# Patient Record
Sex: FEMALE | Race: WHITE | Employment: OTHER | URBAN - METROPOLITAN AREA
[De-identification: names, ages, dates, MRNs, and addresses within clinical notes are randomized per-mention and may not be internally consistent; named-entity substitution may affect disease eponyms.]

---

## 2022-10-24 ENCOUNTER — PREP FOR PROCEDURE (OUTPATIENT)
Dept: ORTHOPEDIC SURGERY | Age: 72
End: 2022-10-24

## 2022-10-24 DIAGNOSIS — Z01.818 PRE-OP TESTING: Primary | ICD-10-CM

## 2022-10-24 RX ORDER — SODIUM CHLORIDE 9 MG/ML
INJECTION, SOLUTION INTRAVENOUS PRN
Status: CANCELLED | OUTPATIENT
Start: 2022-10-24

## 2022-10-24 RX ORDER — SODIUM CHLORIDE 0.9 % (FLUSH) 0.9 %
5-40 SYRINGE (ML) INJECTION PRN
Status: CANCELLED | OUTPATIENT
Start: 2022-10-24

## 2022-10-24 RX ORDER — SODIUM CHLORIDE, SODIUM LACTATE, POTASSIUM CHLORIDE, CALCIUM CHLORIDE 600; 310; 30; 20 MG/100ML; MG/100ML; MG/100ML; MG/100ML
INJECTION, SOLUTION INTRAVENOUS CONTINUOUS
Status: CANCELLED | OUTPATIENT
Start: 2022-10-24

## 2022-10-24 RX ORDER — SODIUM CHLORIDE 0.9 % (FLUSH) 0.9 %
5-40 SYRINGE (ML) INJECTION EVERY 12 HOURS SCHEDULED
Status: CANCELLED | OUTPATIENT
Start: 2022-10-24

## 2022-10-24 RX ORDER — ACETAMINOPHEN 325 MG/1
1000 TABLET ORAL ONCE
Status: CANCELLED | OUTPATIENT
Start: 2022-10-24 | End: 2022-10-24

## 2022-10-24 NOTE — H&P
HISTORY AND PHYSICAL             Date: 10/24/2022        Patient Name: Ana Cristina Grace     YOB: 1950      Age:  67 y.o. Chief Complaint   Bilateral knee pain          History Obtained From   patient    History of Present Illness   70year-old retiree presented for evaluation of bilateral knee pain and stiffness. Progressive symptoms over the last 4 years. Previous successful left hip replacement in [place]. Has noted significant loss of range of motion in her knees. Difficulty rising from a chair and negotiating a step. Has to hold onto someone and/or use a cane. No prior surgery on either knee. Has been using meloxicam and Tylenol. Previous intra-articular injections without much symptom reduction. Notes reduction in quality of life. Intermittent night pain in both knees. No history of DVT. Past Medical History   No past medical history on file. Past Surgical History   No past surgical history on file. Medications Prior to Admission     Prior to Admission medications    Not on File        Allergies   Patient has no allergy information on record. Social History     Social History    None         Family History   No family history on file. Review of Systems   Review of Systems  Genitourinary: Positive for urinary frequency. Musculoskeletal: Positive for joint swelling, stiffness, arthritis. The remainder of the complete review of systems was negative. Physical Exam   Weight: 245.00 lbs. (111.13 kg.)   Height: 63 in. (160.02 cm.)   Body Mass Index: 43.40   Body Surface Area: 2.11 m2    Physical Exam  General Appearance:   Well developed, well nourished   Eyes:   Eyes appear Normal   Respiratory:   Respiratory effort: non-labored   Cardiovascular:   Dorsalis Pedis Pulse: palpable   Edema: mild   Posterior Tibal Pulse: palpable   Varicosities: absent   Lymphatic/Skin:   Lymphedema Noted: no   Skin Appearance: Normal   Prior Incisions: Absent   Abdomen:   Soft. Nontender. Psychiatric:   Mood and affect: Normal     Right Knee Exam   Inspection   Gait: antalgic   Alignment: varus   Effusion: none   Quadriceps Atrophy: mild   Pain/Tenderness: anterior, medial joint line and lateral joint line   Neurovascularly intact   No signs or symptoms of infection   Laxity with vargus/valgus stress: 6-9 degrees   ROM   Hip ROM: normal   Active ROM   Crepitus with range of motion   Passive ROM   Flexion: . 60 deg   Extension: 5 deg   Muscle Strength   Quadriceps strength: 4/5   Hamstring strength: 4/5   Stability   Medial/MCL: normal   Lateral/LCL: normal   Patella   Patellofemoral Crepitus: yes   Patella Grind: yes   Patella Mobility: fair   Extensor Mechanism: intact   Able to logroll without pain: yes   Other Comments   Negative Homans     Left Knee Exam   Inspection   Gait: antalgic   Alignment: varus   Effusion: none   Quadriceps Atrophy: mild   Pain/Tenderness: anterior, medial joint line and lateral joint line   Neurovascularly intact   No signs or symptoms of infection   Laxity with vargus/valgus stress: 6-9 degrees   ROM   Hip ROM: normal   Active ROM   Crepitus with range of motion   Passive ROM   Flexion: 80 deg   Extension: normal deg   Muscle Strength   Quadriceps strength: 4/5   Hamstring strength: 4/5   Stability   Medial/MCL: normal   Lateral/LCL: normal   Meniscus   Iesha Test: negative   Patella   Patellofemoral Crepitus: yes   Patella Grind: yes   Extensor Mechanism: intact   Able to logroll without pain: yes   Other Comments   Negative Homans    Labs    No results found for this or any previous visit (from the past 24 hour(s)). Imaging/Diagnostics Last 24 Hours   Right Knee X-Ray   X-fay findings: Severe tricompartmental osteoarthritis right knee. Bone-on-bone contact. Abundant marginal osteophytes from the femur, posterior tibia, and patellofemoral articulation. Subchondral sclerosis. Varus alignment. End-stage DJD.     Left Knee X-Ray   X-ray Findings: Severe left knee osteoarthritis. Tricompartmental bone-on-bone contact. Abundant marginal osteophytes in the patellofemoral joint, posterior femur and posterior tibia. Subchondral sclerosis. Varus alignment. Assessment    1. Knee joint pain, left  2. Knee joint pain, right  3. Stiffness of left knee joint  4. Stiffness of right knee joint  5. Left knee osteoarthritis  6. Right knee osteoarthritis    Plan   Severe bilateral knee osteoarthritis with stiffness unresponsive to conservative treatment. Patient updated regarding x-ray findings. Reviewed treatment options. I think she would notice improvement in both pain and knee function/range of motion if she proceeded with elective total joint replacement in staged fashion. I reviewed the surgical procedure, including implants, as well as the expected postoperative course. The risks benefits alternatives and limitations were discussed and informed consent obtained. Initiate physical therapy prior to surgery to help with strengthening and range of motion as well as gait training. Surgical date left TKA 11/3/2022 at [facility]. Preoperative instructions given. All questions addressed. Return to office 2 weeks postop with x-rays left knee.     Consultations Ordered:  None    Electronically signed by Osorio Merida PA-C on 10/24/22 at 8:27 AM LIVIER

## 2022-10-31 ENCOUNTER — ANESTHESIA EVENT (OUTPATIENT)
Dept: OPERATING ROOM | Age: 72
End: 2022-10-31
Payer: MEDICARE

## 2022-10-31 ENCOUNTER — HOSPITAL ENCOUNTER (OUTPATIENT)
Dept: PREADMISSION TESTING | Age: 72
Discharge: HOME | End: 2022-10-31
Payer: MEDICARE

## 2022-10-31 VITALS
HEIGHT: 63 IN | WEIGHT: 243 LBS | BODY MASS INDEX: 43.05 KG/M2 | TEMPERATURE: 96.9 F | DIASTOLIC BLOOD PRESSURE: 81 MMHG | OXYGEN SATURATION: 96 % | SYSTOLIC BLOOD PRESSURE: 194 MMHG | RESPIRATION RATE: 20 BRPM | HEART RATE: 106 BPM

## 2022-10-31 DIAGNOSIS — Z01.818 PRE-OP TESTING: ICD-10-CM

## 2022-10-31 LAB
ANION GAP SERPL CALCULATED.3IONS-SCNC: 13 MMOL/L (ref 7–16)
BACTERIA: NORMAL /HPF
BILIRUBIN URINE: NEGATIVE
BLOOD, URINE: ABNORMAL
BUN BLDV-MCNC: 19 MG/DL (ref 6–23)
CALCIUM SERPL-MCNC: 10.2 MG/DL (ref 8.6–10.2)
CHLORIDE BLD-SCNC: 101 MMOL/L (ref 98–107)
CLARITY: CLEAR
CO2: 26 MMOL/L (ref 22–29)
COLOR: YELLOW
CREAT SERPL-MCNC: 0.8 MG/DL (ref 0.5–1)
GFR SERPL CREATININE-BSD FRML MDRD: >60 ML/MIN/1.73
GLUCOSE BLD-MCNC: 127 MG/DL (ref 74–99)
GLUCOSE URINE: NEGATIVE MG/DL
HCT VFR BLD CALC: 45.6 % (ref 34–48)
HEMOGLOBIN: 13.7 G/DL (ref 11.5–15.5)
KETONES, URINE: 15 MG/DL
LEUKOCYTE ESTERASE, URINE: NEGATIVE
MCH RBC QN AUTO: 26.9 PG (ref 26–35)
MCHC RBC AUTO-ENTMCNC: 30 % (ref 32–34.5)
MCV RBC AUTO: 89.6 FL (ref 80–99.9)
NITRITE, URINE: NEGATIVE
PDW BLD-RTO: 15.4 FL (ref 11.5–15)
PH UA: 6 (ref 5–9)
PLATELET # BLD: 286 E9/L (ref 130–450)
PMV BLD AUTO: 9.9 FL (ref 7–12)
POTASSIUM SERPL-SCNC: 4.2 MMOL/L (ref 3.5–5)
PROTEIN UA: ABNORMAL MG/DL
RBC # BLD: 5.09 E12/L (ref 3.5–5.5)
RBC UA: NORMAL /HPF (ref 0–2)
SODIUM BLD-SCNC: 140 MMOL/L (ref 132–146)
SPECIFIC GRAVITY UA: 1.02 (ref 1–1.03)
UROBILINOGEN, URINE: 0.2 E.U./DL
WBC # BLD: 9.7 E9/L (ref 4.5–11.5)
WBC UA: NORMAL /HPF (ref 0–5)

## 2022-10-31 PROCEDURE — 81001 URINALYSIS AUTO W/SCOPE: CPT

## 2022-10-31 PROCEDURE — 85027 COMPLETE CBC AUTOMATED: CPT

## 2022-10-31 PROCEDURE — 36415 COLL VENOUS BLD VENIPUNCTURE: CPT

## 2022-10-31 PROCEDURE — 87081 CULTURE SCREEN ONLY: CPT

## 2022-10-31 PROCEDURE — 80048 BASIC METABOLIC PNL TOTAL CA: CPT

## 2022-10-31 RX ORDER — CLONIDINE HYDROCHLORIDE 0.1 MG/1
TABLET ORAL
COMMUNITY
Start: 2022-10-20 | End: 2022-11-29 | Stop reason: ALTCHOICE

## 2022-10-31 RX ORDER — ZINC GLUCONATE 50 MG
50 TABLET ORAL DAILY
Status: ON HOLD | COMMUNITY

## 2022-10-31 RX ORDER — LEVOTHYROXINE SODIUM 0.07 MG/1
75 TABLET ORAL DAILY
Status: ON HOLD | COMMUNITY
Start: 2022-09-08

## 2022-10-31 RX ORDER — LOSARTAN POTASSIUM 100 MG/1
100 TABLET ORAL DAILY
Status: ON HOLD | COMMUNITY
End: 2022-11-06 | Stop reason: HOSPADM

## 2022-10-31 RX ORDER — MELOXICAM 15 MG/1
15 TABLET ORAL DAILY
Status: ON HOLD | COMMUNITY
Start: 2022-09-08

## 2022-10-31 RX ORDER — ASPIRIN 81 MG/1
81 TABLET ORAL DAILY
Status: ON HOLD | COMMUNITY
End: 2022-11-04 | Stop reason: HOSPADM

## 2022-10-31 RX ORDER — ASCORBIC ACID 500 MG
1000 TABLET ORAL DAILY
Status: ON HOLD | COMMUNITY

## 2022-10-31 RX ORDER — PRAVASTATIN SODIUM 80 MG/1
80 TABLET ORAL DAILY
Status: ON HOLD | COMMUNITY
Start: 2018-03-23

## 2022-10-31 ASSESSMENT — KOOS JR
BENDING TO THE FLOOR TO PICK UP OBJECT: 1
STANDING UPRIGHT: 2
TWISING OR PIVOTING ON KNEE: 4
KOOS JR TOTAL INTERVAL SCORE: 31.307
STRAIGHTENING KNEE FULLY: 4
GOING UP OR DOWN STAIRS: 4
RISING FROM SITTING: 4
HOW SEVERE IS YOUR KNEE STIFFNESS AFTER FIRST WAKING IN MORNING: 3

## 2022-10-31 ASSESSMENT — PAIN DESCRIPTION - PAIN TYPE: TYPE: CHRONIC PAIN

## 2022-10-31 ASSESSMENT — PAIN DESCRIPTION - ORIENTATION: ORIENTATION: LEFT

## 2022-10-31 ASSESSMENT — PAIN - FUNCTIONAL ASSESSMENT: PAIN_FUNCTIONAL_ASSESSMENT: PREVENTS OR INTERFERES SOME ACTIVE ACTIVITIES AND ADLS

## 2022-10-31 ASSESSMENT — PAIN DESCRIPTION - FREQUENCY: FREQUENCY: INTERMITTENT

## 2022-10-31 ASSESSMENT — PAIN DESCRIPTION - DESCRIPTORS: DESCRIPTORS: SHARP

## 2022-10-31 ASSESSMENT — PAIN SCALES - GENERAL: PAINLEVEL_OUTOF10: 5

## 2022-10-31 ASSESSMENT — PAIN DESCRIPTION - LOCATION: LOCATION: KNEE

## 2022-10-31 ASSESSMENT — PAIN DESCRIPTION - ONSET: ONSET: ON-GOING

## 2022-10-31 NOTE — ANESTHESIA PRE PROCEDURE
Department of Anesthesiology  Preprocedure Note       Name:  Jesús Carroll   Age:  67 y.o.  :  1950                                          MRN:  [id number]         Date:  10/31/2022      Surgeon: Bruce Beverly):  Batsheva Madrigal MD    Procedure: Procedure(s):  LEFT KNEE TOTAL ARTHROPLASTY   ++RYLEY++  ++PNB++    Medications prior to admission:   Prior to Admission medications    Medication Sig Start Date End Date Taking? Authorizing Provider   levothyroxine (SYNTHROID) 75 MCG tablet TAKE 1 TABLET BY MOUTH ONCE DAILY 22   Historical Provider, MD   pravastatin (PRAVACHOL) 80 MG tablet  3/23/18   Historical Provider, MD   meloxicam (MOBIC) 15 MG tablet TAKE 1 TABLET BY MOUTH ONCE DAILY 22   Historical Provider, MD   cloNIDine (CATAPRES) 0.1 MG tablet TAKE 1 TABLET BY MOUTH 4 TIMES DAILY AS NEEDED FOR BLOOD PRESSURE WHEN SBP>160. Patient not taking: Reported on 10/31/2022 10/20/22   Historical Provider, MD   losartan (COZAAR) 100 MG tablet Take 100 mg by mouth daily    Historical Provider, MD   aspirin 81 MG EC tablet Take 81 mg by mouth daily    Historical Provider, MD   vitamin C (ASCORBIC ACID) 500 MG tablet Take 1,000 mg by mouth daily    Historical Provider, MD   Cholecalciferol (VITAMIN D3) 125 MCG (5000 UT) TABS Take 1 tablet by mouth daily    Historical Provider, MD   zinc gluconate 50 MG tablet Take 50 mg by mouth daily    Historical Provider, MD       Current medications:    No current facility-administered medications for this encounter. Current Outpatient Medications   Medication Sig Dispense Refill    levothyroxine (SYNTHROID) 75 MCG tablet TAKE 1 TABLET BY MOUTH ONCE DAILY      pravastatin (PRAVACHOL) 80 MG tablet       meloxicam (MOBIC) 15 MG tablet TAKE 1 TABLET BY MOUTH ONCE DAILY      cloNIDine (CATAPRES) 0.1 MG tablet TAKE 1 TABLET BY MOUTH 4 TIMES DAILY AS NEEDED FOR BLOOD PRESSURE WHEN SBP>160.  (Patient not taking: Reported on 10/31/2022)      losartan (COZAAR) 100 MG tablet Take 100 mg by mouth daily      aspirin 81 MG EC tablet Take 81 mg by mouth daily      vitamin C (ASCORBIC ACID) 500 MG tablet Take 1,000 mg by mouth daily      Cholecalciferol (VITAMIN D3) 125 MCG (5000 UT) TABS Take 1 tablet by mouth daily      zinc gluconate 50 MG tablet Take 50 mg by mouth daily         Allergies:  No Known Allergies    Problem List:  There is no problem list on file for this patient. Past Medical History:        Diagnosis Date    Arthritis     Hyperlipidemia     Hypertension     PONV (postoperative nausea and vomiting)     Thyroid disease        Past Surgical History:        Procedure Laterality Date    BREAST SURGERY Left     cysts removed, hyplasia    COLONOSCOPY  2012    HYSTERECTOMY (CERVIX STATUS UNKNOWN)  1980    JOINT REPLACEMENT Left 2018    hip       Social History:    Social History     Tobacco Use    Smoking status: Never    Smokeless tobacco: Never   Substance Use Topics    Alcohol use: Not Currently                                Counseling given: Not Answered      Vital Signs (Current): There were no vitals filed for this visit.                                            BP Readings from Last 3 Encounters:   10/31/22 (!) 194/81       NPO Status:                                                                                 BMI:   Wt Readings from Last 3 Encounters:   10/31/22 243 lb (110.2 kg)     There is no height or weight on file to calculate BMI.    CBC:   Lab Results   Component Value Date/Time    WBC 9.7 10/31/2022 10:50 AM    RBC 5.09 10/31/2022 10:50 AM    HGB 13.7 10/31/2022 10:50 AM    HCT 45.6 10/31/2022 10:50 AM    MCV 89.6 10/31/2022 10:50 AM    RDW 15.4 10/31/2022 10:50 AM     10/31/2022 10:50 AM       CMP:   Lab Results   Component Value Date/Time     10/31/2022 10:50 AM    K 4.2 10/31/2022 10:50 AM     10/31/2022 10:50 AM    CO2 26 10/31/2022 10:50 AM    BUN 19 10/31/2022 10:50 AM    CREATININE 0.8 10/31/2022 10:50 AM LABGLOM >60 10/31/2022 10:50 AM    GLUCOSE 127 10/31/2022 10:50 AM    CALCIUM 10.2 10/31/2022 10:50 AM       POC Tests: No results for input(s): POCGLU, POCNA, POCK, POCCL, POCBUN, POCHEMO, POCHCT in the last 72 hours. Coags: No results found for: PROTIME, INR, APTT    HCG (If Applicable): No results found for: PREGTESTUR, PREGSERUM, HCG, HCGQUANT     ABGs: No results found for: PHART, PO2ART, GZQ3UBA, KNM4JSA, BEART, P0SCQITG     Type & Screen (If Applicable):  No results found for: LABABO, LABRH    Drug/Infectious Status (If Applicable):  No results found for: HIV, HEPCAB    COVID-19 Screening (If Applicable): No results found for: COVID19        Anesthesia Evaluation  Patient summary reviewed and Nursing notes reviewed   history of anesthetic complications: PONV. Airway: Mallampati: III  TM distance: >3 FB   Neck ROM: full  Mouth opening: > = 3 FB   Dental:      Comment: None loose    Pulmonary:Negative Pulmonary ROS breath sounds clear to auscultation                             Cardiovascular:    (+) hypertension:,       ECG reviewed  Rhythm: regular  Rate: normal           Beta Blocker:  Not on Beta Blocker         Neuro/Psych:   (+) depression/anxiety             GI/Hepatic/Renal:   (+) hiatal hernia, morbid obesity          Endo/Other:    (+) hypothyroidism: arthritis:., .                 Abdominal:             Vascular: Other Findings:           Anesthesia Plan      spinal and general     ASA 3     (Medical clearance on chart. Discussed with pt. And her  [employer] Adductor canal block and they agree to this.)  Induction: intravenous. Anesthetic plan and risks discussed with patient and spouse. Use of blood products discussed with patient and spouse whom. Plan discussed with CRNA.                     Edward Diop MD   10/31/2022        Chart reviewed, agree with above  CHRISTO Osborne - CRNA           Patient seen and examined, chart reviewed, agree with above findings. Anesthetic plan, risks, benefits, alternatives, and personnel involved discussed with patient. Patient verbalized an understanding and agreed to proceed. NPO status confirmed. Anesthetic plan discussed with care team members and agreed upon.     Jono Gomes DO   11/3/2022  7:57 AM

## 2022-10-31 NOTE — PROGRESS NOTES
CBC, Urinalysis results faxed to Dr. Florina Tinsley office for review prior to [facility] 11/3/2022

## 2022-10-31 NOTE — PROGRESS NOTES
I met with Abundio Arguelles and her  this am for an orthopaedic education class. We discussed information regarding pre, intra, post-op, discharge, and LOS expectations. I provided ortho education materials. I encouraged the patient to call with any questions or concerns.

## 2022-10-31 NOTE — PROGRESS NOTES
[facility] PRE-ADMISSION TESTING INSTRUCTIONS    The Preadmission Testing patient is instructed accordingly using the following criteria (check applicable):    ARRIVAL INSTRUCTIONS:  [x] Parking the day of Surgery is located in the Main Entrance lot. Upon entering the door, make an immediate right to the surgery reception desk    [x] Bring photo ID and insurance card    [x] Bring in a copy of Living will or Durable Power of  papers. [x] Please be sure to arrange for responsible adult to provide transportation to and from the hospital    [x] Please arrange for responsible adult to be with you for the 24 hour period post procedure due to having anesthesia    [x] If you awake am of surgery not feeling well or have temperature >100 please call [phone]    GENERAL INSTRUCTIONS:    [x] Nothing by mouth after midnight, including gum, candy, mints or water    [x] You may brush your teeth, but do not swallow any water    [] Take medications as instructed with 1-2 oz of water    [x] Stop herbal supplements and vitamins 5 days prior to procedure    [x] Follow preop dosing of blood thinners per physician instructions    [] Take 1/2 dose of evening insulin, but no insulin after midnight    [] No oral diabetic medications after midnight    [] If diabetic and have low blood sugar or feel symptomatic, take 1-2oz apple juice only    [] Bring inhalers day of surgery    [] Bring C-PAP/ Bi-Pap day of surgery    [] Bring urine specimen day of surgery    [] Shower or bath with soap, lather and rinse well, AM of Surgery, no lotion, powders or creams to surgical site    [] Follow bowel prep as instructed per surgeon    [x] No tobacco products within 24 hours of surgery     [x] No alcohol or illegal drug use within 24 hours of surgery.     [x] Jewelry, body piercing's, eyeglasses, contact lenses and dentures are not permitted into surgery (bring cases)      [x] Please do not wear any nail polish, make up or hair products on the day of surgery    [x] You can expect a call the business day prior to procedure to notify you if your arrival time changes    [x] If you receive a survey after surgery we would greatly appreciate your comments    [] Parent/guardian of a minor must accompany their child and remain on the premises  the entire time they are under our care     [] Pediatric patients may bring favorite toy, blanket or comfort item with them    [] A caregiver or family member must remain with the patient during their stay if they are mentally handicapped, have dementia, disoriented or unable to use a call light or would be a safety concern if left unattended    [x] Please notify surgeon if you develop any illness between now and time of surgery (cold, cough, sore throat, fever, nausea, vomiting) or any signs of infections  including skin, wounds, and dental.    [x]  The Outpatient Pharmacy is available to fill your prescription here on your day of surgery, ask your preop nurse for details    [x] Other instructions: wear loose,comfortable     EDUCATIONAL MATERIALS PROVIDED:    [x] PAT Preoperative Education Packet/Booklet     [x] Medication List    [] Transfusion bracelet applied with instructions    [x] Shower with soap, lather and rinse well, and use CHG wipes provided the evening before surgery as instructed    [x] Incentive spirometer with instructions

## 2022-11-01 LAB — MRSA CULTURE ONLY: NORMAL

## 2022-11-01 NOTE — CARE COORDINATION
Pt seen in ortho class. Plan for TKA on 11/3. Lives in [facility] level home. Has ww, commode-previous ortho surgery. Pt lives in [place]. Referral called to [facility] [phone] for referral and faxed facesheet to [phone].  Will follow up on 11/3 and fax final orders-o

## 2022-11-03 ENCOUNTER — HOSPITAL ENCOUNTER (OUTPATIENT)
Age: 72
Setting detail: OBSERVATION
Discharge: HOME | End: 2022-11-06
Attending: ORTHOPAEDIC SURGERY | Admitting: ORTHOPAEDIC SURGERY
Payer: MEDICARE

## 2022-11-03 ENCOUNTER — APPOINTMENT (OUTPATIENT)
Dept: GENERAL RADIOLOGY | Age: 72
End: 2022-11-03
Attending: ORTHOPAEDIC SURGERY
Payer: MEDICARE

## 2022-11-03 ENCOUNTER — ANESTHESIA (OUTPATIENT)
Dept: OPERATING ROOM | Age: 72
End: 2022-11-03
Payer: MEDICARE

## 2022-11-03 DIAGNOSIS — G89.18 POST-OPERATIVE PAIN: Primary | ICD-10-CM

## 2022-11-03 DIAGNOSIS — M17.12 PRIMARY OSTEOARTHRITIS OF LEFT KNEE: ICD-10-CM

## 2022-11-03 PROCEDURE — 2500000003 HC RX 250 WO HCPCS: Performed by: NURSE ANESTHETIST, CERTIFIED REGISTERED

## 2022-11-03 PROCEDURE — 97165 OT EVAL LOW COMPLEX 30 MIN: CPT

## 2022-11-03 PROCEDURE — 2709999900 HC NON-CHARGEABLE SUPPLY: Performed by: ORTHOPAEDIC SURGERY

## 2022-11-03 PROCEDURE — A4217 STERILE WATER/SALINE, 500 ML: HCPCS | Performed by: ORTHOPAEDIC SURGERY

## 2022-11-03 PROCEDURE — C1776 JOINT DEVICE (IMPLANTABLE): HCPCS | Performed by: ORTHOPAEDIC SURGERY

## 2022-11-03 PROCEDURE — 6370000000 HC RX 637 (ALT 250 FOR IP): Performed by: ORTHOPAEDIC SURGERY

## 2022-11-03 PROCEDURE — 2580000003 HC RX 258: Performed by: NURSE ANESTHETIST, CERTIFIED REGISTERED

## 2022-11-03 PROCEDURE — 6360000002 HC RX W HCPCS

## 2022-11-03 PROCEDURE — 2580000003 HC RX 258: Performed by: ORTHOPAEDIC SURGERY

## 2022-11-03 PROCEDURE — C1729 CATH, DRAINAGE: HCPCS | Performed by: ORTHOPAEDIC SURGERY

## 2022-11-03 PROCEDURE — 99205 OFFICE O/P NEW HI 60 MIN: CPT | Performed by: INTERNAL MEDICINE

## 2022-11-03 PROCEDURE — G0378 HOSPITAL OBSERVATION PER HR: HCPCS

## 2022-11-03 PROCEDURE — 2500000003 HC RX 250 WO HCPCS

## 2022-11-03 PROCEDURE — 3600000015 HC SURGERY LEVEL 5 ADDTL 15MIN: Performed by: ORTHOPAEDIC SURGERY

## 2022-11-03 PROCEDURE — 97530 THERAPEUTIC ACTIVITIES: CPT

## 2022-11-03 PROCEDURE — 2500000003 HC RX 250 WO HCPCS: Performed by: PHYSICIAN ASSISTANT

## 2022-11-03 PROCEDURE — 6360000002 HC RX W HCPCS: Performed by: ANESTHESIOLOGY

## 2022-11-03 PROCEDURE — 3700000001 HC ADD 15 MINUTES (ANESTHESIA): Performed by: ORTHOPAEDIC SURGERY

## 2022-11-03 PROCEDURE — 97161 PT EVAL LOW COMPLEX 20 MIN: CPT

## 2022-11-03 PROCEDURE — 6360000002 HC RX W HCPCS: Performed by: PHYSICIAN ASSISTANT

## 2022-11-03 PROCEDURE — 76942 ECHO GUIDE FOR BIOPSY: CPT | Performed by: ANESTHESIOLOGY

## 2022-11-03 PROCEDURE — 6360000002 HC RX W HCPCS: Performed by: NURSE ANESTHETIST, CERTIFIED REGISTERED

## 2022-11-03 PROCEDURE — 2580000003 HC RX 258: Performed by: PHYSICIAN ASSISTANT

## 2022-11-03 PROCEDURE — 73560 X-RAY EXAM OF KNEE 1 OR 2: CPT

## 2022-11-03 PROCEDURE — 6360000002 HC RX W HCPCS: Performed by: ORTHOPAEDIC SURGERY

## 2022-11-03 PROCEDURE — 7100000000 HC PACU RECOVERY - FIRST 15 MIN: Performed by: ORTHOPAEDIC SURGERY

## 2022-11-03 PROCEDURE — 3600000005 HC SURGERY LEVEL 5 BASE: Performed by: ORTHOPAEDIC SURGERY

## 2022-11-03 PROCEDURE — 2500000003 HC RX 250 WO HCPCS: Performed by: ORTHOPAEDIC SURGERY

## 2022-11-03 PROCEDURE — 88305 TISSUE EXAM BY PATHOLOGIST: CPT

## 2022-11-03 PROCEDURE — 3700000000 HC ANESTHESIA ATTENDED CARE: Performed by: ORTHOPAEDIC SURGERY

## 2022-11-03 PROCEDURE — C1713 ANCHOR/SCREW BN/BN,TIS/BN: HCPCS | Performed by: ORTHOPAEDIC SURGERY

## 2022-11-03 PROCEDURE — 6370000000 HC RX 637 (ALT 250 FOR IP): Performed by: PHYSICIAN ASSISTANT

## 2022-11-03 PROCEDURE — 88311 DECALCIFY TISSUE: CPT

## 2022-11-03 PROCEDURE — 7100000001 HC PACU RECOVERY - ADDTL 15 MIN: Performed by: ORTHOPAEDIC SURGERY

## 2022-11-03 DEVICE — IMPLANTABLE DEVICE: Type: IMPLANTABLE DEVICE | Site: KNEE | Status: FUNCTIONAL

## 2022-11-03 DEVICE — CEMENT BNE 40GM W/ GENT HI VISC RADPQ FOR REV SURG: Type: IMPLANTABLE DEVICE | Site: KNEE | Status: FUNCTIONAL

## 2022-11-03 DEVICE — EXTENSION STEM L30MM DIA14MM KNEE TAPR CEM PERSONA: Type: IMPLANTABLE DEVICE | Site: KNEE | Status: FUNCTIONAL

## 2022-11-03 DEVICE — COMPONENT FEM SZ 7 NAR L KNEE CO CHROM CEM POST STBL COR: Type: IMPLANTABLE DEVICE | Site: KNEE | Status: FUNCTIONAL

## 2022-11-03 DEVICE — COMPONENT PAT DIA29MM THK8MM KNEE POLY CEM CONVENTIONAL: Type: IMPLANTABLE DEVICE | Site: KNEE | Status: FUNCTIONAL

## 2022-11-03 DEVICE — PSN TIB STM 5 DEG SZ C L: Type: IMPLANTABLE DEVICE | Site: KNEE | Status: FUNCTIONAL

## 2022-11-03 RX ORDER — ASCORBIC ACID 500 MG
1000 TABLET ORAL DAILY
Status: DISCONTINUED | OUTPATIENT
Start: 2022-11-03 | End: 2022-11-06 | Stop reason: HOSPADM

## 2022-11-03 RX ORDER — SODIUM CHLORIDE 0.9 % (FLUSH) 0.9 %
5-40 SYRINGE (ML) INJECTION PRN
Status: DISCONTINUED | OUTPATIENT
Start: 2022-11-03 | End: 2022-11-06 | Stop reason: HOSPADM

## 2022-11-03 RX ORDER — FENTANYL CITRATE 50 UG/ML
100 INJECTION, SOLUTION INTRAMUSCULAR; INTRAVENOUS ONCE
Status: COMPLETED | OUTPATIENT
Start: 2022-11-03 | End: 2022-11-03

## 2022-11-03 RX ORDER — ONDANSETRON 2 MG/ML
4 INJECTION INTRAMUSCULAR; INTRAVENOUS
Status: DISCONTINUED | OUTPATIENT
Start: 2022-11-03 | End: 2022-11-03 | Stop reason: HOSPADM

## 2022-11-03 RX ORDER — MIDAZOLAM HYDROCHLORIDE 1 MG/ML
1 INJECTION INTRAMUSCULAR; INTRAVENOUS EVERY 5 MIN PRN
Status: DISCONTINUED | OUTPATIENT
Start: 2022-11-03 | End: 2022-11-03 | Stop reason: HOSPADM

## 2022-11-03 RX ORDER — ASPIRIN 81 MG/1
81 TABLET ORAL 2 TIMES DAILY
Status: DISCONTINUED | OUTPATIENT
Start: 2022-11-03 | End: 2022-11-06 | Stop reason: HOSPADM

## 2022-11-03 RX ORDER — ONDANSETRON 2 MG/ML
4 INJECTION INTRAMUSCULAR; INTRAVENOUS EVERY 6 HOURS PRN
Status: DISCONTINUED | OUTPATIENT
Start: 2022-11-03 | End: 2022-11-06 | Stop reason: HOSPADM

## 2022-11-03 RX ORDER — MIDAZOLAM HYDROCHLORIDE 1 MG/ML
INJECTION INTRAMUSCULAR; INTRAVENOUS PRN
Status: DISCONTINUED | OUTPATIENT
Start: 2022-11-03 | End: 2022-11-03 | Stop reason: SDUPTHER

## 2022-11-03 RX ORDER — ONDANSETRON 4 MG/1
4 TABLET, ORALLY DISINTEGRATING ORAL EVERY 8 HOURS PRN
Status: DISCONTINUED | OUTPATIENT
Start: 2022-11-03 | End: 2022-11-06 | Stop reason: HOSPADM

## 2022-11-03 RX ORDER — PRAVASTATIN SODIUM 10 MG
10 TABLET ORAL NIGHTLY
Status: DISCONTINUED | OUTPATIENT
Start: 2022-11-03 | End: 2022-11-06 | Stop reason: HOSPADM

## 2022-11-03 RX ORDER — KETOROLAC TROMETHAMINE 30 MG/ML
15 INJECTION, SOLUTION INTRAMUSCULAR; INTRAVENOUS EVERY 6 HOURS
Status: DISPENSED | OUTPATIENT
Start: 2022-11-03 | End: 2022-11-04

## 2022-11-03 RX ORDER — SODIUM CHLORIDE 0.9 % (FLUSH) 0.9 %
5-40 SYRINGE (ML) INJECTION EVERY 12 HOURS SCHEDULED
Status: DISCONTINUED | OUTPATIENT
Start: 2022-11-03 | End: 2022-11-03 | Stop reason: HOSPADM

## 2022-11-03 RX ORDER — LOSARTAN POTASSIUM 50 MG/1
100 TABLET ORAL DAILY
Status: DISCONTINUED | OUTPATIENT
Start: 2022-11-03 | End: 2022-11-06 | Stop reason: HOSPADM

## 2022-11-03 RX ORDER — ACETAMINOPHEN 500 MG
1000 TABLET ORAL ONCE
Status: COMPLETED | OUTPATIENT
Start: 2022-11-03 | End: 2022-11-03

## 2022-11-03 RX ORDER — SODIUM CHLORIDE 9 MG/ML
INJECTION, SOLUTION INTRAVENOUS CONTINUOUS PRN
Status: DISCONTINUED | OUTPATIENT
Start: 2022-11-03 | End: 2022-11-03 | Stop reason: SDUPTHER

## 2022-11-03 RX ORDER — SODIUM CHLORIDE, SODIUM LACTATE, POTASSIUM CHLORIDE, CALCIUM CHLORIDE 600; 310; 30; 20 MG/100ML; MG/100ML; MG/100ML; MG/100ML
INJECTION, SOLUTION INTRAVENOUS CONTINUOUS
Status: DISCONTINUED | OUTPATIENT
Start: 2022-11-03 | End: 2022-11-06

## 2022-11-03 RX ORDER — VITAMIN B COMPLEX
5000 TABLET ORAL DAILY
Status: DISCONTINUED | OUTPATIENT
Start: 2022-11-03 | End: 2022-11-06 | Stop reason: HOSPADM

## 2022-11-03 RX ORDER — SODIUM CHLORIDE 9 MG/ML
INJECTION, SOLUTION INTRAVENOUS PRN
Status: DISCONTINUED | OUTPATIENT
Start: 2022-11-03 | End: 2022-11-03 | Stop reason: HOSPADM

## 2022-11-03 RX ORDER — SODIUM CHLORIDE 0.9 % (FLUSH) 0.9 %
5-40 SYRINGE (ML) INJECTION PRN
Status: DISCONTINUED | OUTPATIENT
Start: 2022-11-03 | End: 2022-11-03 | Stop reason: HOSPADM

## 2022-11-03 RX ORDER — PROPOFOL 10 MG/ML
INJECTION, EMULSION INTRAVENOUS CONTINUOUS PRN
Status: DISCONTINUED | OUTPATIENT
Start: 2022-11-03 | End: 2022-11-03 | Stop reason: SDUPTHER

## 2022-11-03 RX ORDER — VANCOMYCIN HYDROCHLORIDE 1 G/20ML
INJECTION, POWDER, LYOPHILIZED, FOR SOLUTION INTRAVENOUS PRN
Status: DISCONTINUED | OUTPATIENT
Start: 2022-11-03 | End: 2022-11-03 | Stop reason: ALTCHOICE

## 2022-11-03 RX ORDER — FAMOTIDINE 20 MG/1
20 TABLET, FILM COATED ORAL 2 TIMES DAILY
Status: DISCONTINUED | OUTPATIENT
Start: 2022-11-03 | End: 2022-11-06 | Stop reason: HOSPADM

## 2022-11-03 RX ORDER — DEXAMETHASONE SODIUM PHOSPHATE 4 MG/ML
INJECTION, SOLUTION INTRA-ARTICULAR; INTRALESIONAL; INTRAMUSCULAR; INTRAVENOUS; SOFT TISSUE PRN
Status: DISCONTINUED | OUTPATIENT
Start: 2022-11-03 | End: 2022-11-03 | Stop reason: SDUPTHER

## 2022-11-03 RX ORDER — DIPHENHYDRAMINE HCL 25 MG
25 TABLET ORAL EVERY 6 HOURS PRN
Status: DISCONTINUED | OUTPATIENT
Start: 2022-11-03 | End: 2022-11-06 | Stop reason: HOSPADM

## 2022-11-03 RX ORDER — ONDANSETRON 2 MG/ML
INJECTION INTRAMUSCULAR; INTRAVENOUS PRN
Status: DISCONTINUED | OUTPATIENT
Start: 2022-11-03 | End: 2022-11-03 | Stop reason: SDUPTHER

## 2022-11-03 RX ORDER — OXYCODONE HYDROCHLORIDE 5 MG/1
5 TABLET ORAL EVERY 4 HOURS PRN
Status: DISCONTINUED | OUTPATIENT
Start: 2022-11-03 | End: 2022-11-06 | Stop reason: HOSPADM

## 2022-11-03 RX ORDER — ROPIVACAINE HYDROCHLORIDE 5 MG/ML
30 INJECTION, SOLUTION EPIDURAL; INFILTRATION; PERINEURAL ONCE
Status: COMPLETED | OUTPATIENT
Start: 2022-11-03 | End: 2022-11-03

## 2022-11-03 RX ORDER — MORPHINE SULFATE 2 MG/ML
2 INJECTION, SOLUTION INTRAMUSCULAR; INTRAVENOUS EVERY 4 HOURS PRN
Status: DISCONTINUED | OUTPATIENT
Start: 2022-11-03 | End: 2022-11-06 | Stop reason: HOSPADM

## 2022-11-03 RX ORDER — LEVOTHYROXINE SODIUM 0.07 MG/1
75 TABLET ORAL DAILY
Status: DISCONTINUED | OUTPATIENT
Start: 2022-11-03 | End: 2022-11-06 | Stop reason: HOSPADM

## 2022-11-03 RX ORDER — ZINC SULFATE 50(220)MG
50 CAPSULE ORAL DAILY
Status: DISCONTINUED | OUTPATIENT
Start: 2022-11-03 | End: 2022-11-06 | Stop reason: HOSPADM

## 2022-11-03 RX ORDER — SENNA AND DOCUSATE SODIUM 50; 8.6 MG/1; MG/1
1 TABLET, FILM COATED ORAL 2 TIMES DAILY
Status: DISCONTINUED | OUTPATIENT
Start: 2022-11-03 | End: 2022-11-06 | Stop reason: HOSPADM

## 2022-11-03 RX ORDER — BUPIVACAINE HYDROCHLORIDE 7.5 MG/ML
INJECTION, SOLUTION INTRASPINAL PRN
Status: DISCONTINUED | OUTPATIENT
Start: 2022-11-03 | End: 2022-11-03 | Stop reason: SDUPTHER

## 2022-11-03 RX ORDER — SODIUM CHLORIDE 9 MG/ML
INJECTION, SOLUTION INTRAVENOUS PRN
Status: DISCONTINUED | OUTPATIENT
Start: 2022-11-03 | End: 2022-11-06 | Stop reason: HOSPADM

## 2022-11-03 RX ORDER — SODIUM CHLORIDE 0.9 % (FLUSH) 0.9 %
5-40 SYRINGE (ML) INJECTION EVERY 12 HOURS SCHEDULED
Status: DISCONTINUED | OUTPATIENT
Start: 2022-11-03 | End: 2022-11-06 | Stop reason: HOSPADM

## 2022-11-03 RX ORDER — SODIUM CHLORIDE, SODIUM LACTATE, POTASSIUM CHLORIDE, CALCIUM CHLORIDE 600; 310; 30; 20 MG/100ML; MG/100ML; MG/100ML; MG/100ML
INJECTION, SOLUTION INTRAVENOUS CONTINUOUS
Status: DISCONTINUED | OUTPATIENT
Start: 2022-11-03 | End: 2022-11-03

## 2022-11-03 RX ORDER — DEXAMETHASONE SODIUM PHOSPHATE 10 MG/ML
8 INJECTION, SOLUTION INTRAMUSCULAR; INTRAVENOUS ONCE
Status: DISCONTINUED | OUTPATIENT
Start: 2022-11-03 | End: 2022-11-03 | Stop reason: HOSPADM

## 2022-11-03 RX ORDER — ACETAMINOPHEN 325 MG/1
650 TABLET ORAL EVERY 6 HOURS
Status: DISCONTINUED | OUTPATIENT
Start: 2022-11-03 | End: 2022-11-06 | Stop reason: HOSPADM

## 2022-11-03 RX ORDER — DIPHENHYDRAMINE HYDROCHLORIDE 50 MG/ML
25 INJECTION INTRAMUSCULAR; INTRAVENOUS EVERY 6 HOURS PRN
Status: DISCONTINUED | OUTPATIENT
Start: 2022-11-03 | End: 2022-11-06 | Stop reason: HOSPADM

## 2022-11-03 RX ORDER — PHENYLEPHRINE HCL IN 0.9% NACL 1 MG/10 ML
SYRINGE (ML) INTRAVENOUS PRN
Status: DISCONTINUED | OUTPATIENT
Start: 2022-11-03 | End: 2022-11-03 | Stop reason: SDUPTHER

## 2022-11-03 RX ADMIN — Medication 100 MCG: at 10:09

## 2022-11-03 RX ADMIN — BUPIVACAINE HYDROCHLORIDE IN DEXTROSE 1.6 ML: 7.5 INJECTION, SOLUTION SUBARACHNOID at 09:27

## 2022-11-03 RX ADMIN — DOCUSATE SODIUM AND SENNOSIDES 1 TABLET: 8.6; 5 TABLET, FILM COATED ORAL at 19:55

## 2022-11-03 RX ADMIN — KETOROLAC TROMETHAMINE 15 MG: 30 INJECTION, SOLUTION INTRAMUSCULAR; INTRAVENOUS at 19:56

## 2022-11-03 RX ADMIN — Medication 100 MCG: at 10:19

## 2022-11-03 RX ADMIN — PROPOFOL 100 MCG/KG/MIN: 10 INJECTION, EMULSION INTRAVENOUS at 09:27

## 2022-11-03 RX ADMIN — FENTANYL CITRATE 50 MCG: 0.05 INJECTION, SOLUTION INTRAMUSCULAR; INTRAVENOUS at 08:34

## 2022-11-03 RX ADMIN — OXYCODONE HYDROCHLORIDE AND ACETAMINOPHEN 1000 MG: 500 TABLET ORAL at 14:28

## 2022-11-03 RX ADMIN — ACETAMINOPHEN 650 MG: 325 TABLET ORAL at 19:04

## 2022-11-03 RX ADMIN — ASPIRIN 81 MG: 81 TABLET, COATED ORAL at 19:55

## 2022-11-03 RX ADMIN — KETOROLAC TROMETHAMINE 15 MG: 30 INJECTION, SOLUTION INTRAMUSCULAR; INTRAVENOUS at 14:28

## 2022-11-03 RX ADMIN — MIDAZOLAM HYDROCHLORIDE 1 MG: 2 INJECTION, SOLUTION INTRAMUSCULAR; INTRAVENOUS at 08:34

## 2022-11-03 RX ADMIN — SODIUM CHLORIDE: 9 INJECTION, SOLUTION INTRAVENOUS at 10:10

## 2022-11-03 RX ADMIN — FAMOTIDINE 20 MG: 20 TABLET ORAL at 19:56

## 2022-11-03 RX ADMIN — OXYCODONE 5 MG: 5 TABLET ORAL at 14:27

## 2022-11-03 RX ADMIN — FAMOTIDINE 20 MG: 20 TABLET ORAL at 14:29

## 2022-11-03 RX ADMIN — PRAVASTATIN SODIUM 10 MG: 10 TABLET ORAL at 19:55

## 2022-11-03 RX ADMIN — OXYCODONE 5 MG: 5 TABLET ORAL at 19:01

## 2022-11-03 RX ADMIN — Medication 5000 UNITS: at 14:28

## 2022-11-03 RX ADMIN — ONDANSETRON 4 MG: 2 INJECTION INTRAMUSCULAR; INTRAVENOUS at 10:54

## 2022-11-03 RX ADMIN — SODIUM CHLORIDE, PRESERVATIVE FREE 10 ML: 5 INJECTION INTRAVENOUS at 20:12

## 2022-11-03 RX ADMIN — Medication 100 MCG: at 09:47

## 2022-11-03 RX ADMIN — DOCUSATE SODIUM AND SENNOSIDES 1 TABLET: 8.6; 5 TABLET, FILM COATED ORAL at 14:28

## 2022-11-03 RX ADMIN — ROPIVACAINE HYDROCHLORIDE 30 ML: 5 INJECTION, SOLUTION EPIDURAL; INFILTRATION; PERINEURAL at 08:42

## 2022-11-03 RX ADMIN — LOSARTAN POTASSIUM 100 MG: 50 TABLET, FILM COATED ORAL at 14:28

## 2022-11-03 RX ADMIN — DEXAMETHASONE SODIUM PHOSPHATE 10 MG: 4 INJECTION, SOLUTION INTRAMUSCULAR; INTRAVENOUS at 09:44

## 2022-11-03 RX ADMIN — CEFAZOLIN 2000 MG: 2 INJECTION, POWDER, FOR SOLUTION INTRAMUSCULAR; INTRAVENOUS at 19:01

## 2022-11-03 RX ADMIN — ACETAMINOPHEN 650 MG: 325 TABLET ORAL at 14:28

## 2022-11-03 RX ADMIN — MIDAZOLAM 1 MG: 1 INJECTION INTRAMUSCULAR; INTRAVENOUS at 10:30

## 2022-11-03 RX ADMIN — LEVOTHYROXINE SODIUM 75 MCG: 75 TABLET ORAL at 14:28

## 2022-11-03 RX ADMIN — SODIUM CHLORIDE: 9 INJECTION, SOLUTION INTRAVENOUS at 09:10

## 2022-11-03 RX ADMIN — Medication 100 MCG: at 09:49

## 2022-11-03 RX ADMIN — SODIUM CHLORIDE, POTASSIUM CHLORIDE, SODIUM LACTATE AND CALCIUM CHLORIDE: 600; 310; 30; 20 INJECTION, SOLUTION INTRAVENOUS at 14:32

## 2022-11-03 RX ADMIN — WATER 2000 MG: 1 INJECTION INTRAMUSCULAR; INTRAVENOUS; SUBCUTANEOUS at 09:10

## 2022-11-03 RX ADMIN — TRANEXAMIC ACID 1000 MG: 1 INJECTION, SOLUTION INTRAVENOUS at 09:10

## 2022-11-03 RX ADMIN — TRANEXAMIC ACID 1000 MG: 100 INJECTION, SOLUTION INTRAVENOUS at 12:51

## 2022-11-03 RX ADMIN — ACETAMINOPHEN 1000 MG: 500 TABLET ORAL at 08:04

## 2022-11-03 RX ADMIN — Medication 100 MCG: at 10:07

## 2022-11-03 ASSESSMENT — PAIN DESCRIPTION - ORIENTATION: ORIENTATION: LEFT

## 2022-11-03 ASSESSMENT — PAIN DESCRIPTION - LOCATION: LOCATION: KNEE

## 2022-11-03 ASSESSMENT — PAIN - FUNCTIONAL ASSESSMENT: PAIN_FUNCTIONAL_ASSESSMENT: 0-10

## 2022-11-03 ASSESSMENT — PAIN DESCRIPTION - DESCRIPTORS
DESCRIPTORS: ACHING
DESCRIPTORS: DISCOMFORT

## 2022-11-03 ASSESSMENT — PAIN SCALES - GENERAL
PAINLEVEL_OUTOF10: 4
PAINLEVEL_OUTOF10: 3
PAINLEVEL_OUTOF10: 0
PAINLEVEL_OUTOF10: 0

## 2022-11-03 NOTE — PROGRESS NOTES
Left adductor canal block completed per Dr Divina Lord with good toleration.  called to bedside. Call light within reach.

## 2022-11-03 NOTE — CONSULTS
[facility]      Reason for Consult:  Medical management    History of Present Illness:  67 y.o. female with a history of osteoarthritis, also hypertension, hyperlipidemia, presents with left knee now requiring surgical treatment. Patient did undergo left knee total arthroplasty today, consulted for medical management. Informant(s) for H&P: Patient as well as current chart    REVIEW OF SYSTEMS:  Complete ROS performed with patient, pertinent positives and negatives are listed in the HPI. PMH:  Past Medical History:   Diagnosis Date    Arthritis     Hyperlipidemia     Hypertension     PONV (postoperative nausea and vomiting)     Thyroid disease        Surgical History:  Past Surgical History:   Procedure Laterality Date    BREAST SURGERY Left     cysts removed, hyplasia    COLONOSCOPY  2012    HYSTERECTOMY (CERVIX STATUS UNKNOWN)  1980    JOINT REPLACEMENT Left 2018    hip    TOTAL KNEE ARTHROPLASTY Left 11/3/2022    LEFT KNEE TOTAL ARTHROPLASTY performed by Layla Cardenas MD at [facility] OR       Medications Prior to Admission:    Prior to Admission medications    Medication Sig Start Date End Date Taking? Authorizing Provider   levothyroxine (SYNTHROID) 75 MCG tablet TAKE 1 TABLET BY MOUTH ONCE DAILY 9/8/22   Historical Provider, MD   pravastatin (PRAVACHOL) 80 MG tablet  3/23/18   Historical Provider, MD   meloxicam (MOBIC) 15 MG tablet TAKE 1 TABLET BY MOUTH ONCE DAILY 9/8/22   Historical Provider, MD   cloNIDine (CATAPRES) 0.1 MG tablet TAKE 1 TABLET BY MOUTH 4 TIMES DAILY AS NEEDED FOR BLOOD PRESSURE WHEN SBP>160.   Patient not taking: No sig reported 10/20/22   Historical Provider, MD   losartan (COZAAR) 100 MG tablet Take 100 mg by mouth daily    Historical Provider, MD   aspirin 81 MG EC tablet Take 81 mg by mouth daily    Historical Provider, MD   vitamin C (ASCORBIC ACID) 500 MG tablet Take 1,000 mg by mouth daily    Historical Provider, MD   Cholecalciferol (VITAMIN D3) 125 MCG (5000 UT) TABS Take 1 tablet by mouth daily    Historical Provider, MD   zinc gluconate 50 MG tablet Take 50 mg by mouth daily    Historical Provider, MD       Allergies:    Patient has no known allergies. Social History:    reports that she has never smoked. She has never used smokeless tobacco. She reports that she does not currently use alcohol. She reports that she does not currently use drugs. Family History:   History reviewed. No pertinent family history. PHYSICAL EXAM:  Vitals:  BP (!) 147/78   Pulse 78   Temp 96.8 °F (36 °C) (Tympanic)   Resp 16   SpO2 93%   General Appearance: alert and oriented to person, place and time, well-developed and well-nourished, in no acute distress  Skin: warm and dry, no rash or erythema  Head: normocephalic and atraumatic  Eyes: pupils equal, round, and reactive to light, extraocular eye movements intact, conjunctivae normal  ENT: tympanic membrane, external ear and ear canal normal bilaterally, oropharynx clear and moist with normal mucous membranes  Neck: neck supple and non tender without mass, no thyromegaly or thyroid nodules, no cervical lymphadenopathy   Pulmonary/Chest: clear to auscultation bilaterally- no wheezes, rales or rhonchi, normal air movement, no respiratory distress  Cardiovascular: normal rate, normal S1 and S2, no gallops, intact distal pulses, and no carotid bruits  Abdomen: soft, non-tender, non-distended, normal bowel sounds, no masses or organomegaly      LABS:  No results for input(s): NA, K, CL, CO2, BUN, CREATININE, GLUCOSE, CALCIUM in the last 72 hours. No results for input(s): WBC, RBC, HGB, HCT, MCV, MCH, MCHC, RDW, PLT, MPV in the last 72 hours. No results for input(s): POCGLU in the last 72 hours.       Radiology:   XR KNEE LEFT (1-2 VIEWS)   Final Result   Status post left total knee arthroplasty in expected anatomic alignment             EKG:     ASSESSMENT: Principal Problem:    Osteoarthritis of left knee, unspecified osteoarthritis type  Resolved Problems:    * No resolved hospital problems. *      PLAN:    Left knee total arthroplasty, management as per the primary service. Hypertension, patient is on losartan at home, this has been resumed. Hypothyroidism, patient is stable on 75 MCG daily of Synthroid. Hyperlipidemia, patient is on statins. DVT prophylaxis, patient remains on baby aspirin twice daily. Pain is controlled at this time. Thank you very much for this interesting consult and we will continue to follow along. Feel free to call with any questions at [phone]. Electronically signed by Keila Mcfarland MD on 11/3/2022 at 3:34 PM    NOTE: This report was transcribed using voice recognition software. Every effort was made to ensure accuracy; however, inadvertent computerized transcription errors may be present.

## 2022-11-03 NOTE — ANESTHESIA PROCEDURE NOTES
Peripheral Block    Patient location during procedure: procedure area  Reason for block: post-op pain management and at surgeon's request  Start time: 11/3/2022 8:35 AM  End time: 11/3/2022 8:43 AM  Staffing  Performed: anesthesiologist   Anesthesiologist: Keely Loaiza DO  Preanesthetic Checklist  Completed: patient identified, IV checked, site marked, risks and benefits discussed, surgical/procedural consents, equipment checked, pre-op evaluation, timeout performed, anesthesia consent given, oxygen available and monitors applied/VS acknowledged  Peripheral Block   Patient position: supine  Prep: ChloraPrep  Provider prep: mask and sterile gloves  Patient monitoring: cardiac monitor, continuous pulse ox, frequent blood pressure checks and IV access  Block type: Femoral  Adductor canal  Laterality: left  Injection technique: single-shot  Guidance: ultrasound guided  Local infiltration: ropivacaine  Infiltration strength: 0.5 %  Local infiltration: ropivacaine  Dose: 30 mL    Needle   Needle type: insulated echogenic nerve stimulator needle (Stimuplex)   Needle gauge: 20 G  Needle localization: ultrasound guidance  Needle length: 10 cm  Assessment   Injection assessment: negative aspiration for heme, no paresthesia on injection and local visualized surrounding nerve on ultrasound  Slow fractionated injection: yes  Hemodynamics: stable  Real-time US image taken/store: yes  Outcomes: uncomplicated and patient tolerated procedure well

## 2022-11-03 NOTE — BRIEF OP NOTE
Brief Postoperative Note      Patient: Radha Carroll  YOB: 1950  MRN: [id number]    Date of Procedure: 11/3/2022    Pre-Op Diagnosis: Primary osteoarthritis of left knee with chronic knee joint stiffness/arthrofibrosis [M17.12]    Post-Op Diagnosis: Same       Procedure(s):  LEFT KNEE TOTAL ARTHROPLASTY   ++RYLEY++  ++PNB++      (STAFF FROM ROOM 8)    Surgeon(s):  Nestor Wright MD    Assistant:  First Assistant: Thom Mccullough  Physician Assistant: Shagufta Yoder PA-C    Anesthesia: Spinal    Estimated Blood Loss (mL): less than 988     Complications: None    Specimens:   ID Type Source Tests Collected by Time Destination   A : left knee bone Bone Bone SURGICAL PATHOLOGY Nestor Wright MD 11/3/2022 1139        Implants:  Implant Name Type Inv. Item Serial No.  Lot No. LRB No. Used Action   CEMENT BNE 40GM W/ GENT HI VISC RADPQ FOR REV SURG - [id number]  CEMENT BNE 40GM W/ GENT HI VISC RADPQ FOR REV SURG  [employer]- [id number] Left 1 Implanted   EXTENSION STEM L30MM [id number] KNEE TAPR RAVINDER PERSONA - [id number]  EXTENSION STEM L30MM [id number] KNEE TAPR RAVINDER PERSONA  [employer]- [id number] Left 1 Implanted   COMPONENT PAT BUQ90DF THK8MM KNEE POLY RAVINDER CONVENTIONAL - [id number]  COMPONENT PAT [id number] THK8MM KNEE POLY [employer]AVINDER CONVENTIONAL  [employer]- [id number] Left 1 Implanted   COMPONENT FEM SZ 7 MARIO L KNEE CO CHROM RAVINDER POST STBL COR - [id number]  COMPONENT FEM SZ 7 MARIO L KNEE CO CHROM RAVINDER POST STBL COR  [employer]- [id number] Left 1 Implanted   PSN TIB STM 5 DEG SZ C L - [id number]  PSN TIB STM 5 DEG SZ C L  [employer]- [id number] Left 1 Implanted   SURFACE ARTC TIB CD FEM 6-9 H14MM LT KNEE VIVACIT-E CONSTRN - [id number]  SURFACE ARTC TIB CD FEM 6-9 H14MM LT KNEE VIVACIT-E CONSTRN  [employer]- [id number] Left 1 Implanted         Drains:   Closed/Suction Drain Left; Anterior Knee Accordion (Active) Findings: Severe tricompartmental DJD with limited range of motion.     Electronically signed by Amanda Van MD on 11/3/2022 at 11:40 AM

## 2022-11-03 NOTE — ANESTHESIA POSTPROCEDURE EVALUATION
Department of Anesthesiology  Postprocedure Note    Patient: Angus Sepulveda  MRN: [id number]  YOB: 1950  Date of evaluation: 11/3/2022      Procedure Summary     Date: 11/03/22 Room / Location: [facility] OR 02 / [facility]    Anesthesia Start: 7987 Anesthesia Stop: 9908    Procedure: LEFT KNEE TOTAL ARTHROPLASTY   ++RYLEY++  ++PNB++      (STAFF FROM ROOM 8) (Left) Diagnosis:       Primary osteoarthritis of left knee      (Primary osteoarthritis of left knee [M17.12])    Surgeons: Rosibel Cheng MD Responsible Provider: Panda Ellsworth DO    Anesthesia Type: spinal, general ASA Status: 3          Anesthesia Type: No value filed.     Eder Phase I: Eder Score: 10    Eder Phase II:        Anesthesia Post Evaluation    Patient location during evaluation: PACU  Patient participation: complete - patient participated  Level of consciousness: awake and alert  Pain score: 0  Airway patency: patent  Nausea & Vomiting: no nausea and no vomiting  Complications: no  Cardiovascular status: hemodynamically stable  Respiratory status: acceptable  Comments: Recovering from spinal anesthesia as expected

## 2022-11-03 NOTE — PROGRESS NOTES
Physical Therapy  Facility/Department: [facility] SURGERY  Physical Therapy Initial Assessment    Name: Rosalina Carroll  : 1950  MRN: [id number]  Date of Service: 11/3/2022    Patient Diagnosis(es): The encounter diagnosis was Primary osteoarthritis of left knee. Past Medical History:  has a past medical history of Arthritis, Hyperlipidemia, Hypertension, PONV (postoperative nausea and vomiting), and Thyroid disease. Past Surgical History:  has a past surgical history that includes Breast surgery (Left); joint replacement (Left, ); Colonoscopy (); Hysterectomy (); and Total knee arthroplasty (Left, 11/3/2022). Referring provider:  Terry Hawley MD    PT Order:  PT eval and treat     Evaluating PT:  Farooq Sanchez PT, DPT PT [id number]    Room #:  2517/3178-F  Diagnosis:  Primary osteoarthritis of left knee [M17.12]  Osteoarthritis of left knee, unspecified osteoarthritis type [M17.12]  Procedure/Surgery:  left TKA  Precautions:  fall risk, WBAT left LE, drain  Equipment Needs:  none. Pt reported owning a walker and cane    SUBJECTIVE:    Pt lives with her  in a split level home with 4+1 stairs to enter and 2 rail. Once in the home, pt has to do another 7 steps with 2 rails to get to the main living area, bed and bathroom. Pt ambulated with cane PTA. OBJECTIVE:   Initial Evaluation  Date: 11/3 Treatment Short Term/ Long Term   Goals   Was pt agreeable to Eval/treatment? yes     Does pt have pain? Aching in left knee during ambulation. Bed Mobility  Rolling: NT  Supine to sit: SBA  Sit to supine: NT  Scooting: SBA to sitting EOB  supervision   Transfers Sit to stand: Min A  Stand to sit: Min A  Stand pivot: NT  supervision   Ambulation    15 feet and 150 feet with w/w CGA.    200+ feet with w/w supervision    Stair negotiation: ascended and descended  NT   8 steps with 2 rails SBA   ROM Right LE:  hip and ankle WFL. Knee lacking functional range.    Left LE: hip and ankle WFL, knee 0-70 degrees  Increase left knee ROM 0 - 90 degrees   Strength Right LE:  grossly 4/5  Left LE:  grossly 3+ to 4-/5  Increase left knee strength by 1/2 mm grade   Balance Sitting EOB:  supervision  Dynamic Standing:  CGA with w/w  Sitting EOB:  independent  Dynamic Standing:  Supervision with w/w   AM-PAC 6 Clicks 09/38       Pt is A & O x 3  Sensation:  Pt denies numbness and tingling to extremities      Therapeutic Exercises:  educated pt about ankle pumps, quad sets and glut sets at times in bed. Patient education  Pt educated on PT objectives during eval and while in the hospital, LE exercises in bed, hand placement during transfers, WBAT left LE. Patient response to education:   Pt verbalized understanding Pt demonstrated skill Pt requires further education in this area   yes With cueing yes     ASSESSMENT:    Conditions Requiring Skilled Therapeutic Intervention:    [x]Decreased strength     [x]Decreased ROM  [x]Decreased functional mobility  [x]Decreased balance   [x]Decreased endurance   []Decreased posture  []Decreased sensation  []Decreased coordination   []Decreased vision  []Decreased safety awareness   []Increased pain       Comments:  Pt found in bed in bed and left sitting up in the chair with call light in reach and her  present. Treatment:  Patient practiced and was instructed in the following treatment:    Functional mobility performed as documented above. Pt educated about LE exercises at times in bed. No report of dizziness during functional mobility. Cueing required for hand placement during transfers. Pt lacking more knee flexion in right LE compared to surgical leg. Pt using her surgical leg more for transfers. No LOB during ambulation and pt ambulates with reciprocal gait pattern. Pt concerned about ascending/descending the steps due to lack of knee flexion in her non-surgical LE. Pt's/ family goals   1. Practice ascending/descending the steps.   Pt concerned since her non-surgical LE has less flexion compare to her surgical LE. Prognosis is good for reaching above PT goals. Patient and or family understand(s) diagnosis, prognosis, and plan of care. yes    PHYSICAL THERAPY PLAN OF CARE:    PT POC is established based on physician order and patient diagnosis     Diagnosis:  Primary osteoarthritis of left knee [M17.12]  Osteoarthritis of left knee, unspecified osteoarthritis type [M17.12]    Current Treatment Recommendations:     [x] Strengthening to improve independence with functional mobility   [x] ROM to improve independence with functional mobility   [x] Balance Training to improve static/dynamic balance and to reduce fall risk  [x] Endurance Training to improve activity tolerance during functional mobility   [x] Transfer Training to improve safety and independence with all functional transfers   [x] Gait Training to improve gait mechanics, endurance and assess need for appropriate assistive device  [x] Stair Training in preparation for safe discharge home and/or into the community   [] Positioning to prevent skin breakdown and contractures  [x] Safety and Education Training   [x] Patient/Caregiver Education   [x] HEP  [] Other     PT long term treatment goals are located in above grid    Frequency of treatments: BID    Time in  1352  Time out  1414    Total Treatment Time  10 minutes     Evaluation Time includes thorough review of current medical information, gathering information on past medical history/social history and prior level of function, completion of standardized testing/informal observation of tasks, assessment of data and education on plan of care and goals.     CPT codes:  [x] Low Complexity PT evaluation 47002  [] Moderate Complexity PT evaluation 60415  [] High Complexity PT evaluation 60452  [] PT Re-evaluation 34284  [x] Therapeutic activities 30721    10 minutes  [] Therapeutic exercises 19534 __ minutes      Dyan Paul PT, DPT  PT [id number]

## 2022-11-03 NOTE — CARE COORDINATION
Met with patient and spouse about diagnosis and discharge plan of care. Post op left TKA. Pt lives with spouse in split level home. Has all DME(previous hip surgery). Plan is home with [facility] home care [phone], fax [phone]. Will fax home care orders and therapies to above. Will follow-o    Addend: spoke with Karen Mccollum at [facility] care and faxed orders for start of care on Saturday 11/5-[facility]    The Plan for Transition of Care is related to the following treatment goals: home with home care     The Patient and/or patient representative pt was provided with a choice of provider and agrees   with the discharge plan. [x] Yes [] No    Freedom of choice list was provided with basic dialogue that supports the patient's individualized plan of care/goals, treatment preferences and shares the quality data associated with the providers.  [x] Yes [] No

## 2022-11-03 NOTE — H&P
I have examined the patient. I have reviewed the history and physical and find no  relevant changes. I have reviewed with the patient and/or family the risks, benefits, and  alternatives to the procedure.       Full H&P dictated by Celeste Perez PA-C

## 2022-11-03 NOTE — ANESTHESIA PROCEDURE NOTES
Spinal Block    Patient location during procedure: OR  End time: 11/3/2022 9:27 AM  Reason for block: primary anesthetic and at surgeon's request  Staffing  Performed: other anesthesia staff   Anesthesiologist: Francis Castellon DO  Resident/CRNA: CHRISTO Newman - KORY  Other anesthesia staff: Jp Chiu RN  Spinal Block  Patient position: sitting  Prep: Betadine and site prepped and draped  Patient monitoring: cardiac monitor, continuous pulse ox, continuous capnometry and frequent blood pressure checks  Approach: midline  Location: L3/L4  Provider prep: mask, sterile gloves and sterile gown  Local infiltration: lidocaine  Needle  Needle type: Pencan   Needle gauge: 25 G  Needle length: 3.5 in  Assessment  Sensory level: T6  Swirl obtained: Yes  CSF: clear  Attempts: 1  Hemodynamics: stable  Preanesthetic Checklist  Completed: patient identified, IV checked, site marked, risks and benefits discussed, surgical/procedural consents, equipment checked, pre-op evaluation, timeout performed, anesthesia consent given, oxygen available and monitors applied/VS acknowledged

## 2022-11-03 NOTE — PROGRESS NOTES
Occupational Therapy  OCCUPATIONAL THERAPY INITIAL EVALUATION     Jammie Parker [place], [place]        CTVW:                                                  Patient Name: Ann Bence    MRN: [id number]    : 1950    Room: [address] [place] [address]      Evaluating OT: Kathleen Ba OTR/L [id number]      Referring Provider: Petey Dc MD    Specific Provider Orders/Date:OT eval and treat 11/3/22      Diagnosis:  Primary osteoarthritis of left knee [M17.12]  Osteoarthritis of left knee, unspecified osteoarthritis type [M17.12]     Surgery: L TKA 11/3/22     Pertinent Medical History: arthritis, HTN       Precautions:  Fall Risk, WBAT LLE, drain     Assessment of current deficits    [x] Functional mobility  [x]ADLs  [x] Strength               []Cognition    [x] Functional transfers   [x] IADLs         [x] Safety Awareness   [x]Endurance    [] Fine Coordination              [x] Balance      [] Vision/perception   []Sensation     []Gross Motor Coordination  [] ROM  [] Delirium                   [] Motor Control     OT PLAN OF CARE   OT POC based on physician orders, patient diagnosis and results of clinical assessment    Frequency/Duration 2-5 days/wk for 2 weeks PRN   Specific OT Treatment Interventions to include:   * Instruction/training on adapted ADL techniques and AE recommendations to increase functional independence within precautions       * Training on energy conservation strategies, correct breathing pattern and techniques to improve independence/tolerance for self-care routine  * Functional transfer/mobility training/DME recommendations for increased independence, safety, and fall prevention  * Patient/Family education to increase follow through with safety techniques and functional independence  * Recommendation of environmental modifications for increased safety with functional transfers/mobility and ADLs  * Therapeutic exercise to improve motor endurance, ROM, and functional strength for ADLs/functional transfers  * Therapeutic activities to facilitate/challenge dynamic balance, stand tolerance for increased safety and independence with ADLs        Recommended Adaptive Equipment: TBD     Home Living: Pt lives with  in split level home with 4+1 ANA and B hand rails to enter house and then 7 ANA with B hand rails to main level . Pt's bedroom and bathroom are on the main level. Bathroom setup: walk in shower with grab bar outside the shower, elevated commode  Equipment owned: wheeled walker, BSC, cane    Prior Level of Function: independent with ADLs , assist from  with IADLs; functional mobility: cane    Pain Level: pt reported pain in LLE; pt agreeable to therapy  Cognition: A&O: 4/4; WFL command follow demonstrated. Pt pleasant and motivated to return to PLOF and home environment.     Memory:  WFL   Sequencing:  WFL   Problem solving:  WFL   Judgement/safety:  WFL     Functional Assessment:  AM-PAC Daily Activity Raw Score: 17/24   Initial Eval Status  Date: 11/3/22 Treatment Status  Date: STGs = LTGs  Time frame: 10-14 days   Feeding Independent      Grooming CGA  To complete hand hygiene standing at sink  Mod I/I   UB Dressing Min A  For gown management   Mod I/I   LB Dressing Mod A  To don underwear; cues for technique and sequencing and assist to thread over B feet, some assist to manage up B hips   Mod I/I-with use of AD as appropriate/needed   Bathing Mod A  Mod I/I -with use of AD as appropriate/needed   Toileting Min A  For clothing management  SBA  For pericare  Mod I/I    Bed Mobility  Supine to sit: SBA   Independent   Functional Transfers Min A with wheeled walker  Sit to Stand from EOB  Sit<>Stand from commode  Stand to sit to chair  Cues for hand and feet  placement and safe technique   Independent    Functional Mobility CGA with wheeled walker  To and from bathroom and household distance in room and pérez  Cues for safe wheeled walker management  Independent -with device as needed to maximize independence with ADLs and functional task completion   Balance Sitting:     Static:  Sup    Dynamic:SBA  Standing: CGA with wheeled walker  I for static/dynamic sitting balance to maximize independence with ADLs and functional task completion    I for standing balance to maximize independence with ADLs and functional task completion   Activity Tolerance Fair+ with light activity  Good with ADL activity. Pt will demonstrate good understanding of education provided on EC/WS techniques    Visual/  Perceptual Glasses: yes                Additional long-term goal: Pt will increase functional independence to PLOF to allow pt to live in least restrictive environment. Hand Dominance R   AROM (PROM) Strength Additional Info:    RUE  WFL WFL good  and wfl FMC/dexterity noted during ADL tasks     LUE WFL WFL good  and wfl FMC/dexterity noted during ADL tasks     Hearing: WFL   Sensation:  No c/o numbness or tingling   Tone: WFL   Edema: LLE    Comments: Upon arrival patient lying in bed with  present. At end of session, patient sitting in chair with call light and phone within reach, all lines and tubes intact, and  present. Overall patient demonstrated decreased independence and safety during completion of ADL/functional transfer/mobility tasks. Pt would benefit from continued skilled OT to increase safety and independence with completion of ADL/IADL tasks for functional independence and quality of life. Rehab Potential: Good for established goals     Patient / Family Goal: to return home    Patient and/or family were instructed on functional diagnosis, prognosis/goals and OT plan of care. Demonstrated good understanding.      Eval Complexity: Low    Time In: 1351  Time Out: 1411    Min Units   OT Eval Low 97165  x  1   OT Eval Medium 72341      OT Eval High 48031      OT Re-Eval [id number]       Therapeutic Ex 49481 Therapeutic Activities 27213       ADL/Self Care 98693       Orthotic Management 10134       Manual 69527     Neuro Re-Ed 04732       Non-Billable Time          Evaluation Time additionally includes thorough review of current medical information, gathering information on past medical history/social history and prior level of function, interpretation of standardized testing/informal observation of tasks, assessment of data and development of plan of care and goals.             Amaya Young, OTR/L, [id number]

## 2022-11-04 ENCOUNTER — APPOINTMENT (OUTPATIENT)
Dept: GENERAL RADIOLOGY | Age: 72
End: 2022-11-04
Attending: ORTHOPAEDIC SURGERY
Payer: MEDICARE

## 2022-11-04 PROBLEM — I47.1 SVT (SUPRAVENTRICULAR TACHYCARDIA) (HCC): Status: ACTIVE | Noted: 2022-11-04

## 2022-11-04 LAB
ALBUMIN SERPL-MCNC: 3.9 G/DL (ref 3.5–5.2)
ALP BLD-CCNC: 98 U/L (ref 35–104)
ALT SERPL-CCNC: 13 U/L (ref 0–32)
ANION GAP SERPL CALCULATED.3IONS-SCNC: 13 MMOL/L (ref 7–16)
ANION GAP SERPL CALCULATED.3IONS-SCNC: 13 MMOL/L (ref 7–16)
ANISOCYTOSIS: ABNORMAL
AST SERPL-CCNC: 29 U/L (ref 0–31)
BASOPHILS ABSOLUTE: 0.07 E9/L (ref 0–0.2)
BASOPHILS RELATIVE PERCENT: 0.4 % (ref 0–2)
BILIRUB SERPL-MCNC: 0.2 MG/DL (ref 0–1.2)
BUN BLDV-MCNC: 19 MG/DL (ref 6–23)
BUN BLDV-MCNC: 30 MG/DL (ref 6–23)
C-REACTIVE PROTEIN: 1.7 MG/DL (ref 0–0.4)
CALCIUM SERPL-MCNC: 8.8 MG/DL (ref 8.6–10.2)
CALCIUM SERPL-MCNC: 9.2 MG/DL (ref 8.6–10.2)
CHLORIDE BLD-SCNC: 103 MMOL/L (ref 98–107)
CHLORIDE BLD-SCNC: 103 MMOL/L (ref 98–107)
CO2: 22 MMOL/L (ref 22–29)
CO2: 22 MMOL/L (ref 22–29)
CREAT SERPL-MCNC: 1 MG/DL (ref 0.5–1)
CREAT SERPL-MCNC: 1.2 MG/DL (ref 0.5–1)
D DIMER: 909 NG/ML DDU
EOSINOPHILS ABSOLUTE: 0.05 E9/L (ref 0.05–0.5)
EOSINOPHILS RELATIVE PERCENT: 0.3 % (ref 0–6)
GFR SERPL CREATININE-BSD FRML MDRD: 48 ML/MIN/1.73
GFR SERPL CREATININE-BSD FRML MDRD: 60 ML/MIN/1.73
GLUCOSE BLD-MCNC: 130 MG/DL (ref 74–99)
GLUCOSE BLD-MCNC: 156 MG/DL (ref 74–99)
HCT VFR BLD CALC: 32.3 % (ref 34–48)
HCT VFR BLD CALC: 32.4 % (ref 34–48)
HEMOGLOBIN: 10.1 G/DL (ref 11.5–15.5)
HEMOGLOBIN: 10.3 G/DL (ref 11.5–15.5)
IMMATURE GRANULOCYTES #: 0.06 E9/L
IMMATURE GRANULOCYTES %: 0.4 % (ref 0–5)
LACTIC ACID: 1.9 MMOL/L (ref 0.5–2.2)
LYMPHOCYTES ABSOLUTE: 4.65 E9/L (ref 1.5–4)
LYMPHOCYTES RELATIVE PERCENT: 27.7 % (ref 20–42)
MAGNESIUM: 2 MG/DL (ref 1.6–2.6)
MCH RBC QN AUTO: 27.3 PG (ref 26–35)
MCH RBC QN AUTO: 27.8 PG (ref 26–35)
MCHC RBC AUTO-ENTMCNC: 31.3 % (ref 32–34.5)
MCHC RBC AUTO-ENTMCNC: 31.8 % (ref 32–34.5)
MCV RBC AUTO: 87.3 FL (ref 80–99.9)
MCV RBC AUTO: 87.6 FL (ref 80–99.9)
METER GLUCOSE: 130 MG/DL (ref 74–99)
MONOCYTES ABSOLUTE: 2.08 E9/L (ref 0.1–0.95)
MONOCYTES RELATIVE PERCENT: 12.4 % (ref 2–12)
NEUTROPHILS ABSOLUTE: 9.9 E9/L (ref 1.8–7.3)
NEUTROPHILS RELATIVE PERCENT: 58.8 % (ref 43–80)
OVALOCYTES: ABNORMAL
PDW BLD-RTO: 15.2 FL (ref 11.5–15)
PDW BLD-RTO: 15.6 FL (ref 11.5–15)
PLATELET # BLD: 275 E9/L (ref 130–450)
PLATELET # BLD: 313 E9/L (ref 130–450)
PMV BLD AUTO: 9.7 FL (ref 7–12)
PMV BLD AUTO: 9.8 FL (ref 7–12)
POIKILOCYTES: ABNORMAL
POLYCHROMASIA: ABNORMAL
POTASSIUM SERPL-SCNC: 4.1 MMOL/L (ref 3.5–5)
POTASSIUM SERPL-SCNC: 4.7 MMOL/L (ref 3.5–5)
PRO-BNP: 115 PG/ML (ref 0–125)
PROCALCITONIN: 0.14 NG/ML (ref 0–0.08)
RBC # BLD: 3.7 E12/L (ref 3.5–5.5)
RBC # BLD: 3.7 E12/L (ref 3.5–5.5)
SODIUM BLD-SCNC: 138 MMOL/L (ref 132–146)
SODIUM BLD-SCNC: 138 MMOL/L (ref 132–146)
T4 FREE: 1.33 NG/DL (ref 0.93–1.7)
TOTAL PROTEIN: 6.2 G/DL (ref 6.4–8.3)
TROPONIN, HIGH SENSITIVITY: 39 NG/L (ref 0–9)
TSH SERPL DL<=0.05 MIU/L-ACNC: 2.62 UIU/ML (ref 0.27–4.2)
WBC # BLD: 16 E9/L (ref 4.5–11.5)
WBC # BLD: 16.8 E9/L (ref 4.5–11.5)

## 2022-11-04 PROCEDURE — 83880 ASSAY OF NATRIURETIC PEPTIDE: CPT

## 2022-11-04 PROCEDURE — 2580000003 HC RX 258: Performed by: STUDENT IN AN ORGANIZED HEALTH CARE EDUCATION/TRAINING PROGRAM

## 2022-11-04 PROCEDURE — 71045 X-RAY EXAM CHEST 1 VIEW: CPT

## 2022-11-04 PROCEDURE — 84443 ASSAY THYROID STIM HORMONE: CPT

## 2022-11-04 PROCEDURE — 2500000003 HC RX 250 WO HCPCS: Performed by: STUDENT IN AN ORGANIZED HEALTH CARE EDUCATION/TRAINING PROGRAM

## 2022-11-04 PROCEDURE — 97535 SELF CARE MNGMENT TRAINING: CPT

## 2022-11-04 PROCEDURE — 97164 PT RE-EVAL EST PLAN CARE: CPT

## 2022-11-04 PROCEDURE — 2580000003 HC RX 258: Performed by: ORTHOPAEDIC SURGERY

## 2022-11-04 PROCEDURE — 6360000002 HC RX W HCPCS: Performed by: STUDENT IN AN ORGANIZED HEALTH CARE EDUCATION/TRAINING PROGRAM

## 2022-11-04 PROCEDURE — 6370000000 HC RX 637 (ALT 250 FOR IP): Performed by: ORTHOPAEDIC SURGERY

## 2022-11-04 PROCEDURE — 83735 ASSAY OF MAGNESIUM: CPT

## 2022-11-04 PROCEDURE — G0378 HOSPITAL OBSERVATION PER HR: HCPCS

## 2022-11-04 PROCEDURE — 6370000000 HC RX 637 (ALT 250 FOR IP): Performed by: STUDENT IN AN ORGANIZED HEALTH CARE EDUCATION/TRAINING PROGRAM

## 2022-11-04 PROCEDURE — 85027 COMPLETE CBC AUTOMATED: CPT

## 2022-11-04 PROCEDURE — 84484 ASSAY OF TROPONIN QUANT: CPT

## 2022-11-04 PROCEDURE — 80048 BASIC METABOLIC PNL TOTAL CA: CPT

## 2022-11-04 PROCEDURE — 83605 ASSAY OF LACTIC ACID: CPT

## 2022-11-04 PROCEDURE — 86140 C-REACTIVE PROTEIN: CPT

## 2022-11-04 PROCEDURE — 36415 COLL VENOUS BLD VENIPUNCTURE: CPT

## 2022-11-04 PROCEDURE — 97110 THERAPEUTIC EXERCISES: CPT

## 2022-11-04 PROCEDURE — 6360000002 HC RX W HCPCS: Performed by: ORTHOPAEDIC SURGERY

## 2022-11-04 PROCEDURE — 73560 X-RAY EXAM OF KNEE 1 OR 2: CPT

## 2022-11-04 PROCEDURE — 82962 GLUCOSE BLOOD TEST: CPT

## 2022-11-04 PROCEDURE — 97530 THERAPEUTIC ACTIVITIES: CPT

## 2022-11-04 PROCEDURE — 99225 PR SBSQ OBSERVATION CARE/DAY 25 MINUTES: CPT | Performed by: INTERNAL MEDICINE

## 2022-11-04 PROCEDURE — 80053 COMPREHEN METABOLIC PANEL: CPT

## 2022-11-04 PROCEDURE — 84145 PROCALCITONIN (PCT): CPT

## 2022-11-04 PROCEDURE — 85378 FIBRIN DEGRADE SEMIQUANT: CPT

## 2022-11-04 PROCEDURE — 93005 ELECTROCARDIOGRAM TRACING: CPT | Performed by: STUDENT IN AN ORGANIZED HEALTH CARE EDUCATION/TRAINING PROGRAM

## 2022-11-04 PROCEDURE — 85025 COMPLETE CBC W/AUTO DIFF WBC: CPT

## 2022-11-04 PROCEDURE — 84439 ASSAY OF FREE THYROXINE: CPT

## 2022-11-04 PROCEDURE — 6360000002 HC RX W HCPCS

## 2022-11-04 RX ORDER — ADENOSINE 3 MG/ML
18 INJECTION, SOLUTION INTRAVENOUS ONCE
Status: COMPLETED | OUTPATIENT
Start: 2022-11-04 | End: 2022-11-04

## 2022-11-04 RX ORDER — ASPIRIN 81 MG/1
81 TABLET ORAL 2 TIMES DAILY
Qty: 60 TABLET | Refills: 0 | Status: ON HOLD | OUTPATIENT
Start: 2022-11-04 | End: 2022-12-04

## 2022-11-04 RX ORDER — 0.9 % SODIUM CHLORIDE 0.9 %
500 INTRAVENOUS SOLUTION INTRAVENOUS ONCE
Status: COMPLETED | OUTPATIENT
Start: 2022-11-04 | End: 2022-11-05

## 2022-11-04 RX ORDER — ADENOSINE 3 MG/ML
18 INJECTION, SOLUTION INTRAVENOUS ONCE
Status: DISCONTINUED | OUTPATIENT
Start: 2022-11-04 | End: 2022-11-04

## 2022-11-04 RX ORDER — DILTIAZEM HYDROCHLORIDE 5 MG/ML
20 INJECTION INTRAVENOUS ONCE
Status: COMPLETED | OUTPATIENT
Start: 2022-11-04 | End: 2022-11-04

## 2022-11-04 RX ORDER — ADENOSINE 3 MG/ML
12 INJECTION, SOLUTION INTRAVENOUS ONCE
Status: COMPLETED | OUTPATIENT
Start: 2022-11-04 | End: 2022-11-04

## 2022-11-04 RX ORDER — SENNA AND DOCUSATE SODIUM 50; 8.6 MG/1; MG/1
1 TABLET, FILM COATED ORAL 2 TIMES DAILY
Qty: 60 TABLET | Refills: 0 | Status: SHIPPED | OUTPATIENT
Start: 2022-11-04 | End: 2022-11-29 | Stop reason: ALTCHOICE

## 2022-11-04 RX ORDER — ADENOSINE 3 MG/ML
INJECTION, SOLUTION INTRAVENOUS
Status: DISPENSED
Start: 2022-11-04 | End: 2022-11-05

## 2022-11-04 RX ORDER — OXYCODONE HYDROCHLORIDE 5 MG/1
5 TABLET ORAL EVERY 4 HOURS PRN
Qty: 42 TABLET | Refills: 0 | Status: SHIPPED | OUTPATIENT
Start: 2022-11-04 | End: 2022-11-11

## 2022-11-04 RX ADMIN — ACETAMINOPHEN 650 MG: 325 TABLET ORAL at 09:06

## 2022-11-04 RX ADMIN — ADENOSINE 18 MG: 3 INJECTION, SOLUTION INTRAVENOUS at 20:07

## 2022-11-04 RX ADMIN — Medication 5000 UNITS: at 09:09

## 2022-11-04 RX ADMIN — DOCUSATE SODIUM AND SENNOSIDES 1 TABLET: 8.6; 5 TABLET, FILM COATED ORAL at 09:08

## 2022-11-04 RX ADMIN — FAMOTIDINE 20 MG: 20 TABLET ORAL at 09:07

## 2022-11-04 RX ADMIN — KETOROLAC TROMETHAMINE 15 MG: 30 INJECTION, SOLUTION INTRAMUSCULAR; INTRAVENOUS at 09:08

## 2022-11-04 RX ADMIN — ASPIRIN 81 MG: 81 TABLET, COATED ORAL at 09:07

## 2022-11-04 RX ADMIN — ZINC SULFATE 220 MG (50 MG) CAPSULE 50 MG: CAPSULE at 09:10

## 2022-11-04 RX ADMIN — ADENOSINE 18 MG: 3 INJECTION, SOLUTION INTRAVENOUS at 20:50

## 2022-11-04 RX ADMIN — ADENOSINE 12 MG: 3 INJECTION INTRAVENOUS at 20:02

## 2022-11-04 RX ADMIN — SODIUM CHLORIDE, PRESERVATIVE FREE 10 ML: 5 INJECTION INTRAVENOUS at 21:53

## 2022-11-04 RX ADMIN — FAMOTIDINE 20 MG: 20 TABLET ORAL at 21:43

## 2022-11-04 RX ADMIN — LEVOTHYROXINE SODIUM 75 MCG: 75 TABLET ORAL at 06:10

## 2022-11-04 RX ADMIN — KETOROLAC TROMETHAMINE 15 MG: 30 INJECTION, SOLUTION INTRAMUSCULAR; INTRAVENOUS at 03:03

## 2022-11-04 RX ADMIN — SODIUM CHLORIDE 500 ML: 9 INJECTION, SOLUTION INTRAVENOUS at 21:42

## 2022-11-04 RX ADMIN — ASPIRIN 81 MG: 81 TABLET, COATED ORAL at 21:43

## 2022-11-04 RX ADMIN — PRAVASTATIN SODIUM 10 MG: 10 TABLET ORAL at 21:52

## 2022-11-04 RX ADMIN — METOPROLOL TARTRATE 25 MG: 25 TABLET, FILM COATED ORAL at 20:32

## 2022-11-04 RX ADMIN — CEFAZOLIN 2000 MG: 2 INJECTION, POWDER, FOR SOLUTION INTRAMUSCULAR; INTRAVENOUS at 03:03

## 2022-11-04 RX ADMIN — DILTIAZEM HYDROCHLORIDE 20 MG: 5 INJECTION, SOLUTION INTRAVENOUS at 21:24

## 2022-11-04 RX ADMIN — DEXTROSE MONOHYDRATE 5 MG/HR: 50 INJECTION, SOLUTION INTRAVENOUS at 21:49

## 2022-11-04 RX ADMIN — ACETAMINOPHEN 650 MG: 325 TABLET ORAL at 19:44

## 2022-11-04 RX ADMIN — LOSARTAN POTASSIUM 100 MG: 50 TABLET, FILM COATED ORAL at 09:08

## 2022-11-04 RX ADMIN — ACETAMINOPHEN 650 MG: 325 TABLET ORAL at 03:03

## 2022-11-04 RX ADMIN — OXYCODONE HYDROCHLORIDE AND ACETAMINOPHEN 1000 MG: 500 TABLET ORAL at 09:06

## 2022-11-04 RX ADMIN — SODIUM CHLORIDE, PRESERVATIVE FREE 10 ML: 5 INJECTION INTRAVENOUS at 09:09

## 2022-11-04 RX ADMIN — DOCUSATE SODIUM AND SENNOSIDES 1 TABLET: 8.6; 5 TABLET, FILM COATED ORAL at 21:52

## 2022-11-04 RX ADMIN — SODIUM CHLORIDE, PRESERVATIVE FREE 10 ML: 5 INJECTION INTRAVENOUS at 03:11

## 2022-11-04 ASSESSMENT — PAIN SCALES - GENERAL
PAINLEVEL_OUTOF10: 2
PAINLEVEL_OUTOF10: 2
PAINLEVEL_OUTOF10: 3
PAINLEVEL_OUTOF10: 2

## 2022-11-04 ASSESSMENT — PAIN DESCRIPTION - LOCATION
LOCATION: KNEE

## 2022-11-04 ASSESSMENT — PAIN DESCRIPTION - ORIENTATION
ORIENTATION: LEFT

## 2022-11-04 ASSESSMENT — PAIN DESCRIPTION - DESCRIPTORS
DESCRIPTORS: STABBING
DESCRIPTORS: ACHING
DESCRIPTORS: STABBING

## 2022-11-04 NOTE — PROGRESS NOTES
Total Joint    Post op Day 1      S: Patient is 1 day status post left total knee arthroplasty. She is doing well today. She is having some discomfort, but for the most part this is well controlled on the medication she is taking. She denies nausea and vomiting and has been able to tolerate her meals. Worked with physical therapy yesterday and walked in the hallways. She does note that her knee gave out on her yesterday. She is ambulating with the assistance of a walker. Drain intact. Looking forward to discharge home today. O:     Vitals:    11/04/22 0700   BP: 120/65   Pulse: 94   Resp: 17   Temp: 97.8 °F (36.6 °C)   SpO2: 93%        Alert. Pleasant and cooperative throughout the exam.     Aquacel and ace wrap at the left knee are clean, dry and intact. No shadowing noted. Drain intact. Left can flex to about 20 degrees while in bed this morning. Left ankle dorsiflexion and plantarflexion intact. Strength and sensation grossly intact. Bilateral calves are supple and nontender. Patient can wiggle her toes. Bilateral lower extremities neurovascularly intact. H/H:   Recent Labs     11/04/22  0400   HGB 10.1*   HCT 32.3*          A/P:   1 day status post left total knee arthroplasty   Stable   Continue PT/OT, WBAT LLE   Continue Aquacel dressing, okay to shower if intact.   To be removed postop day 7   Pull drain today   Continue oxycodone for pain control   Aspirin 81 mg twice daily for DVT prophylaxis   Progress diet as tolerated   D/c planning-plan for discharge home today

## 2022-11-04 NOTE — CARE COORDINATION
Updated plan of care. POD#1. Plan is home today with [address] completed and notified. Has all DME.  Plan is home today-o

## 2022-11-04 NOTE — PROGRESS NOTES
Called Dr. Abundio Spencer per RN [facility]  Reason Patient fell  Did not hit head Bed Low  Did he want more imaging?

## 2022-11-04 NOTE — PROGRESS NOTES
Physical Therapy  Facility/Department: [facility] SURGERY  Treatment Note  Name: Lro Barraza Masters  : 1950  MRN: [id number]  Date of Service: 2022    Referring provider:  Rona Nuñez MD    PT Order:  PT eval and treat     Evaluating PT:  Rocio Blankenship PT, DPT PT [id number]    Room #:  1437/0224-X  Diagnosis:  Primary osteoarthritis of left knee [M17.12]  Osteoarthritis of left knee, unspecified osteoarthritis type [M17.12]  Procedure/Surgery:  left TKA  Precautions:  fall risk, WBAT left LE, drain, R knee FLEX worse than L knee FLEX  Equipment Needs:  none. Pt reported owning a walker and cane    SUBJECTIVE:    Pt lives with her  in a split level home with 4+1 stairs to enter and 2 rail. Once in the home, pt has to do another 7 steps with 2 rails to get to the main living area, bed and bathroom. Pt ambulated with cane PTA. OBJECTIVE:   Initial Evaluation  Date: 11/3 Treatment Short Term/ Long Term   Goals   Was pt agreeable to Eval/treatment? yes yes    Does pt have pain? Aching in left knee during ambulation. No c/o pain    Bed Mobility  Rolling: NT  Supine to sit: SBA  Sit to supine: NT  Scooting: SBA to sitting EOB Rolling: Independent  Supine to sit: Independent  Sit to supine: MOD A for BLE  Scooting: Independent  supervision   Transfers Sit to stand: Min A  Stand to sit: Min A  Stand pivot: NT Sit to stand: supervision  Stand to sit: supervision  Stand pivot: supervision with ww supervision   Ambulation    15 feet and 150 feet with w/w CGA.    200 feet with ww supervision 200+ feet with w/w supervision    Stair negotiation: ascended and descended  NT  8 steps with 2 rails SBA 8 steps with 2 rails SBA   AM-PAC 6 Clicks 63/36       Pt is alert and oriented x4  BLE ROM is WFL, except R knee is 15-40° and L knee is 10-80°  RLE Strength is grossly 3-/5 to 4/5 in available range, and LLE is grossly 2/5 to 4-/5  Balance: sitting on EOB is Independent and standing with ww is Evaluation Time includes thorough review of current medical information, gathering information on past medical history/social history and prior level of function, completion of standardized testing/informal observation of tasks, assessment of data and education on plan of care and goals. CPT codes:  [] Low Complexity PT evaluation 83989  [] Moderate Complexity PT evaluation 88538  [] High Complexity PT evaluation 08613  [] PT Re-evaluation 61619  [] Gait training 85439 ** minutes  [] Manual therapy 69342 ** minutes  [x] Therapeutic activities 77383 25 minutes  [x] Therapeutic exercises 26819 10 minutes  [] Neuromuscular reeducation 37811 ** minutes     Jose Luis Hughes., P.T.   License Number: [id number]

## 2022-11-04 NOTE — PROGRESS NOTES
Occupational Therapy  OT BEDSIDE TREATMENT NOTE      Date:2022  Patient Name: Navin Stark  MRN: [id number]  : 1950  Room: [address] [place] [address]     Evaluating OT: MAX Recinos/DONYA [id number]       Referring Provider: Spencer Galindo MD    Specific Provider Orders/Date:OT eval and treat 11/3/22       Diagnosis:  Primary osteoarthritis of left knee [M17.12]  Osteoarthritis of left knee, unspecified osteoarthritis type [M17.12]     Surgery: L TKA 11/3/22     Pertinent Medical History: arthritis, HTN       Precautions:  Fall Risk, WBAT LLE, drain      Assessment of current deficits    [x] Functional mobility            [x]ADLs           [x] Strength                  []Cognition    [x] Functional transfers          [x] IADLs         [x] Safety Awareness   [x]Endurance    [] Fine Coordination                         [x] Balance      [] Vision/perception   []Sensation      []Gross Motor Coordination             [] ROM           [] Delirium                   [] Motor Control      OT PLAN OF CARE   OT POC based on physician orders, patient diagnosis and results of clinical assessment     Frequency/Duration 2-5 days/wk for 2 weeks PRN   Specific OT Treatment Interventions to include:   * Instruction/training on adapted ADL techniques and AE recommendations to increase functional independence within precautions       * Training on energy conservation strategies, correct breathing pattern and techniques to improve independence/tolerance for self-care routine  * Functional transfer/mobility training/DME recommendations for increased independence, safety, and fall prevention  * Patient/Family education to increase follow through with safety techniques and functional independence  * Recommendation of environmental modifications for increased safety with functional transfers/mobility and ADLs  * Therapeutic exercise to improve motor endurance, ROM, and functional strength for ADLs/functional transfers  * Therapeutic activities to facilitate/challenge dynamic balance, stand tolerance for increased safety and independence with ADLs           Recommended Adaptive Equipment: TBD      Home Living: Pt lives with  in split level home with 4+1 ANA and B hand rails to enter house and then 7 ANA with B hand rails to main level . Pt's bedroom and bathroom are on the main level. Bathroom setup: walk in shower with grab bar outside the shower, elevated commode  Equipment owned: wheeled walker, BSC, cane     Prior Level of Function: independent with ADLs , assist from  with IADLs; functional mobility: cane     Pain Level: LLE surgical pain, RLE discomfort  Cognition: A&O: 4/4; WFL command follow demonstrated. Pt pleasant and motivated to return to PLOF and home environment. Memory:  WFL              Sequencing:  WFL              Problem solving:  WFL              Judgement/safety:  WFL                Functional Assessment:  AM-PAC Daily Activity Raw Score: 18/24    Initial Eval Status  Date: 11/3/22 Treatment Status  Date: 11/04/22 STGs = LTGs  Time frame: 10-14 days   Feeding Independent        Grooming CGA  To complete hand hygiene standing at sink   Mod I/I   UB Dressing Min A  For gown management   supervision to arrange gown standing Mod I/I   LB Dressing Mod A  To don underwear; cues for technique and sequencing and assist to thread over B feet, some assist to manage up B hips Supervision to don socks using sock aide, sock aide distributed. Patient declined need for reacher or shoe horn and verbalized good understanding of LB dressing technique, declining further practice.    Mod I/I-with use of AD as appropriate/needed   Bathing Mod A  walk in shower transfer SBA after education on technique Mod I/I -with use of AD as appropriate/needed   Toileting Min A  For clothing management  SBA  For pericare   Mod I/I    Bed Mobility  Supine to sit: SBA  Sit to supine min A for LE support  Independent   Functional Transfers Min A with wheeled walker  Sit to Stand from EOB  Sit<>Stand from commode  Stand to sit to chair  Cues for hand and feet  placement and safe technique  Sit <> stand supervision  Max A required to lower patient to floor slowly after abrupt knee buckling without warning, max a x2 required to stand from floor. Independent    Functional Mobility CGA with wheeled walker  To and from bathroom and household distance in room and pérez  Cues for safe wheeled walker management Supervision in pérez and room, however L knee abruptly buckled without warning requiring max A to be assist to floor slowly. Independent -with device as needed to maximize independence with ADLs and functional task completion   Balance Sitting:     Static:  Sup    Dynamic:SBA  Standing: CGA with wheeled walker  standing balance with [address] supervision I for static/dynamic sitting balance to maximize independence with ADLs and functional task completion     I for standing balance to maximize independence with ADLs and functional task completion   Activity Tolerance Fair+ with light activity  fair Good with ADL activity. Pt will demonstrate good understanding of education provided on EC/WS techniques      Comments:  patient cleared with nursing and agreeable to session. Patient with good tolerance of activity and good carry over of instruction. After education on shower transfer, patient entered room and as she was walking around the foot of the bed, left knee buckled abruptly without warning, causing patient to lose balance toward left and posterior tipping walker, VIGIL was able to catch patient and lower patient slowly to floor. Patient did not hit her head and nursing was notified immediately. Nursing and VIGIL assisted patient back to standing and back to bed. Patient did not report new pain after incident. Patient remained in bed with call light in reach at the end of the session.     Education/treatment: ADL and functional transfer/activity performed to increase safety and independence during self care tasks. Education provided on safety awareness, adl reeducation, functional transfer training    Pt has made progress towards set goals.      Time In: 8:55  Time Out: 9:34     Min Units   Therapeutic Ex 51728     Therapeutic Activities 30387 96 2   ADL/Self Care 30816 10 1   Orthotic Management 22084     Neuro Re-Ed 26961     Non-Billable Time     TOTAL TIMED TREATMENT 39 [address] [place] VIGIL/DONYA [id number]

## 2022-11-04 NOTE — PROGRESS NOTES
Jett [facility] Hospitalist   Progress Note    Admitting Date and Time: 11/3/2022  7:26 AM  Admit Dx: Primary osteoarthritis of left knee [M17.12]  Osteoarthritis of left knee, unspecified osteoarthritis type [M17.12]    Subjective: Following for medical management. Patient was admitted with Primary osteoarthritis of left knee [M17.12]  Osteoarthritis of left knee, unspecified osteoarthritis type [M17.12]. Patient feels comfortable, at this time awake, alert, sitting by edge of bed, working with physical therapy, does say night was okay, also says he did not get much sleep. Per RN: No new complaints. ROS: denies fever, chills, cp, sob, n/v, HA unless stated above.      Vitamin D  5,000 Units Oral Daily    levothyroxine  75 mcg Oral Daily    losartan  100 mg Oral Daily    pravastatin  10 mg Oral Nightly    vitamin C  1,000 mg Oral Daily    zinc sulfate  50 mg Oral Daily    sodium chloride flush  5-40 mL IntraVENous 2 times per day    acetaminophen  650 mg Oral Q6H    ketorolac  15 mg IntraVENous Q6H    sennosides-docusate sodium  1 tablet Oral BID    famotidine  20 mg Oral BID    Or    famotidine (PEPCID) injection  20 mg IntraVENous BID    aspirin  81 mg Oral BID     sodium chloride flush, 5-40 mL, PRN  sodium chloride, , PRN  ondansetron, 4 mg, Q8H PRN   Or  ondansetron, 4 mg, Q6H PRN  oxyCODONE, 5 mg, Q4H PRN  morphine, 2 mg, Q4H PRN  diphenhydramine, 25 mg, Q6H PRN   Or  diphenhydrAMINE, 25 mg, Q6H PRN         Objective:    /65   Pulse 94   Temp 97.8 °F (36.6 °C) (Oral)   Resp 17   SpO2 93%   General Appearance: alert and oriented to person, place and time, well-developed and well-nourished, in no acute distress  Skin: warm and dry, no rash or erythema  Head: normocephalic and atraumatic  Eyes: pupils equal, round, and reactive to light, extraocular eye movements intact, conjunctivae normal  ENT: tympanic membrane, external ear and ear canal normal bilaterally, oropharynx clear and moist with normal mucous membranes  Neck: neck supple and non tender without mass, no thyromegaly or thyroid nodules, no cervical lymphadenopathy   Pulmonary/Chest: clear to auscultation bilaterally- no wheezes, rales or rhonchi, normal air movement, no respiratory distress  Cardiovascular: normal rate, normal S1 and S2, no gallops, intact distal pulses, and no carotid bruits  Abdomen: soft, non-tender, non-distended, normal bowel sounds, no masses or organomegaly      Recent Labs     11/04/22  0400      K 4.7      CO2 22   BUN 19   CREATININE 1.0   GLUCOSE 156*   CALCIUM 8.8       Recent Labs     11/04/22  0400   WBC 16.0*   RBC 3.70   HGB 10.1*   HCT 32.3*   MCV 87.3   MCH 27.3   MCHC 31.3*   RDW 15.2*      MPV 9.8     Hemoglobin 10.1, down from 13.7    Radiology:   XR KNEE LEFT (1-2 VIEWS)   Final Result   Status post left total knee arthroplasty in expected anatomic alignment             Assessment:    Principal Problem:    Osteoarthritis of left knee, unspecified osteoarthritis type  Resolved Problems:    * No resolved hospital problems. *      Plan:  Left knee total arthroplasty, management as per the primary service. Patient improving well. Hypertension, patient is on losartan at home, this has been resumed. Controlled. Hypothyroidism, patient is stable on 75 MCG daily of Synthroid. Hyperlipidemia, patient is on statins. DVT prophylaxis, patient remains on baby aspirin twice daily. Pain is controlled at this time. Anemia, to certain extent postoperative drop, will observe. DC planning, depending upon primary service, patient stable from general medicine.         Electronically signed by Hernan Lynch MD on 11/4/2022 at 7:26 AM

## 2022-11-04 NOTE — PROGRESS NOTES
CLINICAL PHARMACY NOTE: MEDS TO BEDS    Total # of Prescriptions Filled: 3   The following medications were delivered to the patient:  Oxycodone 5mg  Senexon    Aspirin 81mg    Additional Documentation:

## 2022-11-04 NOTE — PROGRESS NOTES
Physical Therapy  Facility/Department: [facility] SURGERY  RE-Evaluation/Treatment Note  Name: Amanda Biggs Masters  : 1950  MRN: [id number]  Date of Service: 2022    Referring provider:  No Camara MD    PT Order:  PT eval and treat     Evaluating PT:  Yesenia Regan PT, DPT PT [id number]    Room #:  5337/4457-C  Diagnosis:  Primary osteoarthritis of left knee [M17.12]  Osteoarthritis of left knee, unspecified osteoarthritis type [M17.12]  This am 22 while walking with staff her L knee gave out and she fell with L knee flexing under her and she was lowered to the floor. Pt was told she ruptured her patellar tendon and will have to have surgery in the future. Procedure/Surgery:  left TKA  Precautions:  fall risk, WBAT left LE, drain, R knee FLEX worse than L knee FLEX, new 22 L knee immobilizer  Equipment Needs:  none. Pt reported owning a walker and cane    SUBJECTIVE:    Pt lives with her  in a split level home with 4+1 stairs to enter and 2 rail. Once in the home, pt has to do another 7 steps with 2 rails to get to the main living area, bed and bathroom. Pt ambulated with cane PTA. OBJECTIVE:   Initial Evaluation  Date: 11/3 Treatment Short Term/ Long Term   Goals   Was pt agreeable to Eval/treatment? yes yes    Does pt have pain? Aching in left knee during ambulation. No c/o pain    Bed Mobility  Rolling: NT  Supine to sit: SBA  Sit to supine: NT  Scooting: SBA to sitting EOB Rolling: supervision  Supine to sit: supervision  Sit to supine: MOD A for BLE  Scooting: Independent  supervision   Transfers Sit to stand: Min A  Stand to sit: Min A  Stand pivot: NT Sit to stand: MOD A with bed height elevated. Stand to sit: MIN A  Stand pivot: CGA with ww supervision   Ambulation    15 feet and 150 feet with w/w CGA.    20 feet with ww + feet with w/w supervision    Stair negotiation: ascended and descended  NT  NA 8 steps with 2 rails SBA   AM-PAC 6 Clicks       Pt is alert and oriented x4  BLE ROM is WFL, except R knee is 15-40° and L knee is locked in ext with knee immobilizer  RLE Strength is grossly 3-/5 to 4/5 in available range, and LLE is grossly 2/5 to 4-/5  Balance: sitting on EOB is supervision and standing with ww is CGA    Patient education  Pt educated on donning/doffing L knee immobilizer, not sitting on low surfaces as she now will be unable to flex either knee beyond 40° making it difficult to stand up, and going home in an [facility] so she will be able to stand up out of vehicle when she gets home. Patient response to education:   Pt verbalized understanding Pt demonstrated skill Pt requires further education in this area   yes yes yes     ASSESSMENT:    Comments:  Pt was in bed and L knee immobilizer in place as she ruptured her patellar tendon this am.  She sat up to EOB and needed MOD A to stand with bed height elevated as now neither leg will flex more than 40°. She had to sit back down as knee immobilizer slid down. While sitting on EOB she was educated on doffing and donning knee immobilizer and it was tightened. She stood a second time and was able to walk in the room with slow step too pattern. Pt returned to bed and was educated again on doffing and donning knee immobilizer while lying in bed. Pt then was educated on going home in a vehicle with higher seats to make it easier to get out of when she is home. Pt moved slow and required increased time/  Despite new patellar tendon rupture, she feels she will still be able to go home as she was able to ascend stairs with RLE first and descend with LLE first this am, she has a recliner lift chair, her bed height is higher, and she can borrow her daughter's SUV to go home in.  OT may need to address an elevated toilet seat.       Treatment:  Patient practiced and was instructed in the following treatment:    Bed mobility, transfers, gait with ww, and donning/doffing knee immobilizer to improve functional strength and endurance. Pt was left supine in bed with call light left by patient. PLAN:    Pt is making good progress toward established Physical Therapy goals. Continue with physical therapy current plan of care. Time in  14:50  Time out  15:20    Total Treatment Time 15 minutes     Evaluation Time includes thorough review of current medical information, gathering information on past medical history/social history and prior level of function, completion of standardized testing/informal observation of tasks, assessment of data and education on plan of care and goals. CPT codes:  [] Low Complexity PT evaluation 69578  [] Moderate Complexity PT evaluation 93380  [] High Complexity PT evaluation 03880  [x] PT Re-evaluation 26698  [] Gait training 84277 ** minutes  [] Manual therapy 78853 ** minutes  [x] Therapeutic activities 37284 15 minutes  [] Therapeutic exercises 57910 ** minutes  [] Neuromuscular reeducation 42557 ** minutes     Jose Luis Ireland., P.T.   License Number: [id number]

## 2022-11-04 NOTE — PROGRESS NOTES
Spoke to Dr. Ivan Abdullahi regarding pt's fall. Pt's knee buckled with therapy present, was lowered to the ground and did not hit her head. See new order for immobilizer while up and ambulating and see new order for repeat XR.

## 2022-11-04 NOTE — DISCHARGE INSTRUCTIONS
Discharge Instructions Knee Replacement -Dr. Saud Vergara    Aspirin for DVT prophylaxis x30 days  Pain medication as needed  Ice packs to knee for 30 minutes every 3-4 hours  Keep operative leg elevated on pillows or blankets  Weightbearing as tolerated with walker, transition to cane at discretion of physical therapist  May sleep on either side or supine with pillow between knees  KVNG hose x2 weeks, off at night  May shower daily  Remove knee bandage on postoperative day #7  MVI for home health care, PT and nursing  Return to office in 2 weeks: [phone] ext [phone]

## 2022-11-04 NOTE — OP NOTE
[address] [address]                                OPERATIVE REPORT    PATIENT NAME: Mo Conteh                    :        1950  MED REC NO:   [id number]                            ROOM:       67  ACCOUNT NO:   [de-identified]                           ADMIT DATE: 2022  PROVIDER:     Cher Isabel MD    DATE OF PROCEDURE:  2022    PREOPERATIVE DIAGNOSES:  Severe left knee osteoarthritis with  arthrofibrosis/chronic knee stiffness. POSTOPERATIVE DIAGNOSES:  Severe left knee osteoarthritis with  arthrofibrosis/chronic knee stiffness. PROCEDURE PERFORMED:  Left total knee arthroplasty. SURGEON:  Cher Isabel M.D.    ASSISTANT:  Saima Brower PA-C. No qualified surgical resident  available. ANESTHESIA:  Spinal plus peripheral nerve block. ESTIMATED BLOOD LOSS:  Less than 100. SPECIMENS:  Bone cuts, left knee. DRAIN:  One-eighth-inch Hemovac. COMPLICATIONS:  None. CONDITION:  Stable to recovery room. IMPLANTS:  1  Maggie Persona size 7 left narrow PS femur with a size C left  cruciate tibia with short stem extension, 29-mm domed patella, 14-mm CPS  bearing. 2.  Maggie with antibiotic bone cement x1 package. INDICATION FOR PROCEDURE:  The patient is a 75-year-old woman with  severe left knee pain and chronic knee stiffness secondary to  bone-on-bone osteoarthritis unresponsive to conservative treatment. The  risks, benefits, alternative, and limitations to elective knee  arthroplasty were discussed and informed consent obtained. DESCRIPTION OF PROCEDURE:  The patient met in the preoperative holding  area. The left knee was marked as the correct operative site. Peripheral nerve block given by Anesthesia Department. She was taken to  the operating room, where spinal anesthesia was administered. Intravenous antibiotics and tranexamic acid were given per protocol. Tourniquet placed in the proximal left thigh. The left lower extremity  prepped and draped in the usual sterile fashion. Following a surgical  timeout, the limb was elevated and exsanguinated with an Esmarch. Tourniquet inflated to 250 mmHg. I accessed the left knee through a  midline incision followed by medial parapatellar arthrotomy. Subperiosteal medial release was performed to improve varus alignment. Scar tissue and synovial tissue in the suprapatellar pouch were excised. Patellar fat pad was then partially excised. The patellofemoral joint  had advanced degenerative changes. Circumferential osteophytes were  trimmed. Resection was made with a fan blade and sized to a 29. The  knee was then flexed. She had advanced degenerative changes throughout  the medial and lateral compartments with the eburnated bone and abundant  marginal osteophytes.  hole gained access to the distal femur. Distal femoral resection was set at 5 degrees of valgus taking the  standard cut. An extramedullary tibial alignment guide was secured  parallel to mechanical axis of the tibia. I used a 3-degree cutting  block. Proximal tibial resection was made with an oscillating saw. Bone was removed sacrificing the PCL. The femur was then sized to a 7. Four-in-one cutting block was secured in 3 degrees of external rotation. Femoral bone cuts were made. Lamina  was placed followed by  abundant posterior osteophyte removal and medial and lateral  meniscectomies. Trials were then inserted. With these in place and a  14-mm bearing, she had full extension with minimal hyperextension, knee  flexion improved from initially 75 degrees preoperatively to 125 degrees  with midline patellar tracking. I then completed our femoral  preparation for the box cut. I sized the tibia to a C setting it  rotationally in line with the medial aspect of the tibial tubercle.   I  added a short stem extension given her diminished bone density. The  bone was then copiously lavaged with pulsatile irrigation. Bone ends  were dried. One pack of antibiotic bone cement was mixed using third  generation technique. I then sequentially implanted our components  starting with tibia followed by the femur and then the patella. A trial  insert was placed. Peripheral bone cement was removed. The knee was  lavaged with dilute one liter of Betadine solution followed by saline. Pericapsular injection was given. Final trial reduction confirmed the  14-mm CPS bearing. This was inserted with an intact locking mechanism. One gram of vancomycin powder was placed in the joint. One-eighth-inch  Hemovac drain was brought out laterally. Arthrotomy was closed in mid  flexion with a running #0 Stratafix. The tourniquet was deflated. Skin  was closed in layers. Sterile dressing was applied. The patient  tolerated the procedure well without intraoperative complications. At  the conclusion of the case, all sponge and needle counts were correct. She was transferred from the operating room table onto her hospital bed  and transported to the recovery room in stable condition. Of note,  physician's assistant was present throughout the entirety of the case. Her presence was necessary to aid with patient positioning, draping,  limb positioning, wound closure, application of surgical dressing, and  patient transport from the operating room table. No qualified surgical  resident available.         Suraj Sanchez MD    D: 11/04/2022 14:18:35       T: 11/04/2022 14:21:24     [email or web address]  Job#: [id number]     Doc#: [id number]    CC:

## 2022-11-05 ENCOUNTER — APPOINTMENT (OUTPATIENT)
Dept: NUCLEAR MEDICINE | Age: 72
End: 2022-11-05
Attending: ORTHOPAEDIC SURGERY
Payer: MEDICARE

## 2022-11-05 PROBLEM — G89.18 POST-OPERATIVE PAIN: Status: ACTIVE | Noted: 2022-11-05

## 2022-11-05 LAB
LV EF: 63 %
LVEF MODALITY: NORMAL

## 2022-11-05 PROCEDURE — 99204 OFFICE O/P NEW MOD 45 MIN: CPT | Performed by: INTERNAL MEDICINE

## 2022-11-05 PROCEDURE — 6370000000 HC RX 637 (ALT 250 FOR IP): Performed by: ORTHOPAEDIC SURGERY

## 2022-11-05 PROCEDURE — 3430000000 HC RX DIAGNOSTIC RADIOPHARMACEUTICAL: Performed by: RADIOLOGY

## 2022-11-05 PROCEDURE — 99225 PR SBSQ OBSERVATION CARE/DAY 25 MINUTES: CPT | Performed by: INTERNAL MEDICINE

## 2022-11-05 PROCEDURE — 2700000000 HC OXYGEN THERAPY PER DAY

## 2022-11-05 PROCEDURE — 93306 TTE W/DOPPLER COMPLETE: CPT

## 2022-11-05 PROCEDURE — 96360 HYDRATION IV INFUSION INIT: CPT

## 2022-11-05 PROCEDURE — 93005 ELECTROCARDIOGRAM TRACING: CPT

## 2022-11-05 PROCEDURE — APPSS60 APP SPLIT SHARED TIME 46-60 MINUTES

## 2022-11-05 PROCEDURE — A9540 TC99M MAA: HCPCS | Performed by: RADIOLOGY

## 2022-11-05 PROCEDURE — 96361 HYDRATE IV INFUSION ADD-ON: CPT

## 2022-11-05 PROCEDURE — G0378 HOSPITAL OBSERVATION PER HR: HCPCS

## 2022-11-05 PROCEDURE — 2580000003 HC RX 258: Performed by: ORTHOPAEDIC SURGERY

## 2022-11-05 PROCEDURE — 97530 THERAPEUTIC ACTIVITIES: CPT

## 2022-11-05 PROCEDURE — 78580 LUNG PERFUSION IMAGING: CPT

## 2022-11-05 PROCEDURE — 6370000000 HC RX 637 (ALT 250 FOR IP): Performed by: INTERNAL MEDICINE

## 2022-11-05 RX ORDER — METOPROLOL SUCCINATE 25 MG/1
25 TABLET, EXTENDED RELEASE ORAL DAILY
Status: DISCONTINUED | OUTPATIENT
Start: 2022-11-05 | End: 2022-11-06 | Stop reason: HOSPADM

## 2022-11-05 RX ADMIN — ACETAMINOPHEN 650 MG: 325 TABLET ORAL at 19:17

## 2022-11-05 RX ADMIN — ACETAMINOPHEN 650 MG: 325 TABLET ORAL at 06:26

## 2022-11-05 RX ADMIN — ASPIRIN 81 MG: 81 TABLET, COATED ORAL at 21:34

## 2022-11-05 RX ADMIN — ZINC SULFATE 220 MG (50 MG) CAPSULE 50 MG: CAPSULE at 10:39

## 2022-11-05 RX ADMIN — Medication 5000 UNITS: at 10:36

## 2022-11-05 RX ADMIN — SODIUM CHLORIDE, PRESERVATIVE FREE 10 ML: 5 INJECTION INTRAVENOUS at 10:39

## 2022-11-05 RX ADMIN — PRAVASTATIN SODIUM 10 MG: 10 TABLET ORAL at 21:34

## 2022-11-05 RX ADMIN — OXYCODONE HYDROCHLORIDE AND ACETAMINOPHEN 1000 MG: 500 TABLET ORAL at 10:36

## 2022-11-05 RX ADMIN — ASPIRIN 81 MG: 81 TABLET, COATED ORAL at 10:38

## 2022-11-05 RX ADMIN — LOSARTAN POTASSIUM 100 MG: 50 TABLET, FILM COATED ORAL at 10:37

## 2022-11-05 RX ADMIN — DOCUSATE SODIUM AND SENNOSIDES 1 TABLET: 8.6; 5 TABLET, FILM COATED ORAL at 10:37

## 2022-11-05 RX ADMIN — METOPROLOL SUCCINATE 25 MG: 25 TABLET, EXTENDED RELEASE ORAL at 17:51

## 2022-11-05 RX ADMIN — FAMOTIDINE 20 MG: 20 TABLET ORAL at 10:39

## 2022-11-05 RX ADMIN — SODIUM CHLORIDE, PRESERVATIVE FREE 10 ML: 5 INJECTION INTRAVENOUS at 21:34

## 2022-11-05 RX ADMIN — LEVOTHYROXINE SODIUM 75 MCG: 75 TABLET ORAL at 06:26

## 2022-11-05 RX ADMIN — DOCUSATE SODIUM AND SENNOSIDES 1 TABLET: 8.6; 5 TABLET, FILM COATED ORAL at 21:34

## 2022-11-05 RX ADMIN — ACETAMINOPHEN 650 MG: 325 TABLET ORAL at 02:37

## 2022-11-05 RX ADMIN — Medication 6 MILLICURIE: at 13:10

## 2022-11-05 RX ADMIN — FAMOTIDINE 20 MG: 20 TABLET ORAL at 21:34

## 2022-11-05 RX ADMIN — ACETAMINOPHEN 650 MG: 325 TABLET ORAL at 15:09

## 2022-11-05 ASSESSMENT — PAIN DESCRIPTION - ORIENTATION
ORIENTATION: LEFT
ORIENTATION: LEFT

## 2022-11-05 ASSESSMENT — PAIN - FUNCTIONAL ASSESSMENT
PAIN_FUNCTIONAL_ASSESSMENT: PREVENTS OR INTERFERES SOME ACTIVE ACTIVITIES AND ADLS
PAIN_FUNCTIONAL_ASSESSMENT: PREVENTS OR INTERFERES SOME ACTIVE ACTIVITIES AND ADLS

## 2022-11-05 ASSESSMENT — PAIN DESCRIPTION - LOCATION
LOCATION: LEG
LOCATION: LEG

## 2022-11-05 ASSESSMENT — PAIN DESCRIPTION - DESCRIPTORS
DESCRIPTORS: ACHING
DESCRIPTORS: ACHING

## 2022-11-05 ASSESSMENT — PAIN SCALES - GENERAL
PAINLEVEL_OUTOF10: 3
PAINLEVEL_OUTOF10: 5
PAINLEVEL_OUTOF10: 0

## 2022-11-05 NOTE — SIGNIFICANT EVENT
[facility] RRT/Code Blue Note    Subjective:    Called to bedside for heart rate in the 190s to 200s. This was noticed 5 minutes prior to my arrival.  Patient is asymptomatic and denies any chest pain, shortness of breath, lightheadedness, dizziness, palpitations. Blood pressure was 90s over 60s (MAP 75-80)       adenosine  18 mg IntraVENous Once    Vitamin D  5,000 Units Oral Daily    levothyroxine  75 mcg Oral Daily    losartan  100 mg Oral Daily    pravastatin  10 mg Oral Nightly    vitamin C  1,000 mg Oral Daily    zinc sulfate  50 mg Oral Daily    sodium chloride flush  5-40 mL IntraVENous 2 times per day    acetaminophen  650 mg Oral Q6H    sennosides-docusate sodium  1 tablet Oral BID    famotidine  20 mg Oral BID    Or    famotidine (PEPCID) injection  20 mg IntraVENous BID    aspirin  81 mg Oral BID     sodium chloride flush, 5-40 mL, PRN  sodium chloride, , PRN  ondansetron, 4 mg, Q8H PRN   Or  ondansetron, 4 mg, Q6H PRN  oxyCODONE, 5 mg, Q4H PRN  morphine, 2 mg, Q4H PRN  diphenhydramine, 25 mg, Q6H PRN   Or  diphenhydrAMINE, 25 mg, Q6H PRN         Objective:    BP 93/62   Pulse (!) 190   Temp 98.8 °F (37.1 °C) (Oral)   Resp 16   SpO2 95%       General: Well developed, well nourished. No acute distress. HEENT: NCAT. Non-injected conjunctiva, non-icteric sclera. External eyelids without pus/discharge. No lesions on external ears and nose anteriorly. Neck: Trachea midline. Cardiovascular: RRR, no rubs/gallops. No LE edema. Respiratory: Normal effort, CTAB, no wheezes/rhonchi/rales anteriorly. Genitourinary: Deferred  Rectal: Deferred  Neurological: Grossly nonfocal.   Psychiatry: Appropriate mood and affect. Skin: Warm and non-taut on palpation. No ulcers noted on visible skin.         Recent Labs     11/04/22  0400      K 4.7      CO2 22   BUN 19   CREATININE 1.0   GLUCOSE 156*   CALCIUM 8.8       Recent Labs     11/04/22  0400   WBC 16.0*   RBC 3.70   HGB 10.1*   HCT 32.3*   MCV 87.3   MCH 27.3   MCHC 31.3*   RDW 15.2*      MPV 9.8            I/O last 3 completed shifts: In: 2600 [I.V.:2500; IV Piggyback:100]  Out: 615 [Urine:375; Drains:140; Blood:100]  No intake/output data recorded. Assessment:    Principal Problem:    Osteoarthritis of left knee, unspecified osteoarthritis type  Active Problems:    SVT (supraventricular tachycardia) ([facility])  Resolved Problems:    * No resolved hospital problems. *      Plan:  Called to bedside for heart rate in the 190s to 200s. This was noticed 5 minutes prior to my arrival.  Patient is asymptomatic and denies any chest pain, shortness of breath, lightheadedness, dizziness, palpitations. Blood pressure was 90s over 60s (MAP 75-80). On my exam, patient was in no acute distress, and was sitting comfortably in the bed. Heart rate was in the 200s, and EKG showed SVT. As such, adenosine 12 mg was given without any change in heart rate. Subsequently, adenosine 18 mg was given with subsequent conversion of SVT to sinus tachycardia with a heart rate in the 120s. Patient's blood pressure subsequently improved to 120-30/80. Patient was also given 500 cc IV fluid bolus. Unfortunately, 15 minutes later patient had another episode of SVT with heart rate in the 190's. Another adenosine 18 mg was given with subsequent conversion to sinus tachycardia 110s to 120s. At this point, patient's blood pressure was 140/80, and after patient converted to sinus tachycardia, 20 mg of diltiazem was given, followed by diltiazem drip in order to prevent further episodes of SVT. Patient was subsequently transferred to intermediate floor with telemetry capabilities and the ability to titrate diltiazem gtt for HR <110. Will check additional labs including CBC, CMP, troponin, dimer, BNP, mag, TSH, free T4, CRP, procalcitonin, lactic acid. Additionally will check chest x-ray, as well as serial EKGs.   We will order a TTE as well for further evaluation.       --------------------------------------------    I personally spent 60 minutes providing critical care services to the patient, not including separate billable procedures.      Electronically signed by Sony Tapia MD on 11/4/2022 at 8:15 PM

## 2022-11-05 NOTE — CONSULTS
Inpatient Cardiology Consultation      Reason for Consult: SVT    Consulting Physician: Dr Arnaldo Akers    Requesting Physician:  Sameera Weber MD    Date of Consultation: 11/5/2022    HISTORY OF PRESENT ILLNESS:   Patient is a 66-year-old female who is not known to [facility] cardiology. PMHx: Hyperlipidemia, hypertension, thyroid disease, arthritis, postoperative nausea vomiting,    HPI:  Patient was admitted to hospital 11/3/2022 by orthopedics for total left knee arthroplasty. Patient had knee surgery 31/0/9258 with no complications. 11/4/2022 and 2015 when patient had RRT for heart rates 90s-200s. Patient was asymptomatic during event. Blood pressure was 90/60. EKG revealed SVT. 12 mg adenosine was given without any change. 18 mg adenosine was then given with subsequent orders from SVT to sinus tachycardia in the 120s. Blood pressure increased to 120/80 patient was given 500 mL bolus. 15 minutes later patient had recurrent episode of SVT in the 190s. Another 18 mg adenosine was given with conversion to sinus tachycardia. After conversion patient was given 20 mg of diltiazem followed by diltiazem drip to prevent further episodes. Echo ordered by primary service. Patient also had fall 2011/4 with repeat x-ray of knee showing proximal migration of the patella. Orthopedic ordered knee immobilizer. VQ scan has been ordered to evaluate for PE burden with new SVT. Labs: Potassium 4.1, BUN 30, creatinine 1.2, GFR 48, magnesium 2.0, lactate 1.9, glucose 130, serum calcium 9.2, total protein 6.2, procalcitonin 0.14, CRP 1.7, proBNP 115, troponin 39, albumin 3.9, TSH 2.62, T4 1.33, WBC 16.8, H&H 10.3/34, platelet 197, D-dimer 909  CXR: No acute process  VQ scan: Low probability of PE    Upon assessment 11/5/2022 patient is lying semi-Fragoso in hospital bed on room air eating dinner. Patient is alert and oriented, speaking full sentences, is no apparent distress at this time.   The patient reports last night during her episode of SVT the only symptom she had was diaphoresis that felt like a hot flash which she has had for several years. She denies any new or recent chest pain, SOB/BEAR, palpitations, heart racing, orthopnea, PND, dizziness, or syncope. The patient reports she has never been diagnosed with any cardiac disease and is compliant with her home medications including those for hypertension. The patient is currently off diltiazem drip and is maintaining normal sinus rhythm in the 90s. Repeat EKG to be performed for rule out ischemia. Patient is completely asymptomatic at this time. VS today 97.7, 70 pulse, 140/68, 100% on room air. Please note: past medical records were reviewed per electronic medical record (EMR) - see detailed reports under Past Medical/ Surgical History. Past Medical History:    Hyperlipidemia  Hypertension  Thyroid disease  Arthritis  Postoperative nausea and vomiting  Left breast cyst removal, colonoscopy, hysterectomy, left hip replacement, left total knee    Medications Prior to admit:  Prior to Admission medications    Medication Sig Start Date End Date Taking? Authorizing Provider   oxyCODONE (ROXICODONE) 5 MG immediate release tablet Take 1 tablet by mouth every 4 hours as needed for Pain for up to 7 days. 11/4/22 11/11/22 Yes Vick Yeung PA-C   aspirin 81 MG EC tablet Take 1 tablet by mouth 2 times daily For DVT prophylaxis. After 30 days, resume normal home dose.  11/4/22 12/4/22 Yes Vick Yeung PA-C   sennosides-docusate sodium (SENOKOT-S) 8.6-50 MG tablet Take 1 tablet by mouth 2 times daily For prevention of constipation 11/4/22  Yes Vick Yeung PA-C   levothyroxine (SYNTHROID) 75 MCG tablet TAKE 1 TABLET BY MOUTH ONCE DAILY 9/8/22   Historical Provider, MD   pravastatin (PRAVACHOL) 80 MG tablet  3/23/18   Historical Provider, MD   meloxicam (MOBIC) 15 MG tablet TAKE 1 TABLET BY MOUTH ONCE DAILY 9/8/22   Historical Provider, MD   cloNIDine (CATAPRES) 0.1 MG tablet TAKE 1 TABLET BY MOUTH 4 TIMES DAILY AS NEEDED FOR BLOOD PRESSURE WHEN SBP>160.   Patient not taking: No sig reported 10/20/22   Historical Provider, MD   losartan (COZAAR) 100 MG tablet Take 100 mg by mouth daily    Historical Provider, MD   vitamin C (ASCORBIC ACID) 500 MG tablet Take 1,000 mg by mouth daily    Historical Provider, MD   Cholecalciferol (VITAMIN D3) 125 MCG (5000 UT) TABS Take 1 tablet by mouth daily    Historical Provider, MD   zinc gluconate 50 MG tablet Take 50 mg by mouth daily    Historical Provider, MD       Current Medications:    Current Facility-Administered Medications: perflutren lipid microspheres (DEFINITY) injection 1.5 mL, 1.5 mL, IntraVENous, ONCE PRN  [COMPLETED] dilTIAZem injection 20 mg, 20 mg, IntraVENous, Once **FOLLOWED BY** dilTIAZem 100 mg in dextrose 5 % 100 mL infusion ([place]), 2.5-15 mg/hr, IntraVENous, Continuous  Vitamin D (CHOLECALCIFEROL) tablet 5,000 Units, 5,000 Units, Oral, Daily  levothyroxine (SYNTHROID) tablet 75 mcg, 75 mcg, Oral, Daily  losartan (COZAAR) tablet 100 mg, 100 mg, Oral, Daily  pravastatin (PRAVACHOL) tablet 10 mg, 10 mg, Oral, Nightly  ascorbic acid (VITAMIN C) tablet 1,000 mg, 1,000 mg, Oral, Daily  zinc sulfate (ZINCATE) capsule 50 mg, 50 mg, Oral, Daily  sodium chloride flush 0.9 % injection 5-40 mL, 5-40 mL, IntraVENous, 2 times per day  sodium chloride flush 0.9 % injection 5-40 mL, 5-40 mL, IntraVENous, PRN  0.9 % sodium chloride infusion, , IntraVENous, PRN  acetaminophen (TYLENOL) tablet 650 mg, 650 mg, Oral, Q6H  ondansetron (ZOFRAN-ODT) disintegrating tablet 4 mg, 4 mg, Oral, Q8H PRN **OR** ondansetron (ZOFRAN) injection 4 mg, 4 mg, IntraVENous, Q6H PRN  lactated ringers infusion, , IntraVENous, Continuous  oxyCODONE (ROXICODONE) immediate release tablet 5 mg, 5 mg, Oral, Q4H PRN  morphine (PF) injection 2 mg, 2 mg, IntraVENous, Q4H PRN  sennosides-docusate sodium (SENOKOT-S) 8.6-50 MG tablet 1 tablet, 1 tablet, Oral, BID  diphenhydrAMINE (BENADRYL) tablet 25 mg, 25 mg, Oral, Q6H PRN **OR** diphenhydrAMINE (BENADRYL) injection 25 mg, 25 mg, IntraVENous, Q6H PRN  famotidine (PEPCID) tablet 20 mg, 20 mg, Oral, BID **OR** famotidine (PEPCID) 20 mg in sodium chloride (PF) 0.9 % 10 mL injection, 20 mg, IntraVENous, BID  aspirin EC tablet 81 mg, 81 mg, Oral, BID    Allergies:  Patient has no known allergies. Social History:    Social History     Socioeconomic History    Marital status:      Spouse name: Not on file    Number of children: Not on file    Years of education: Not on file    Highest education level: Not on file   Occupational History    Not on file   Tobacco Use    Smoking status: Never    Smokeless tobacco: Never   Vaping Use    Vaping Use: Never used   Substance and Sexual Activity    Alcohol use: Not Currently    Drug use: Not Currently    Sexual activity: Not on file   Other Topics Concern    Not on file   Social History Narrative    Not on file     Social Determinants of Health     Financial Resource Strain: Not on file   Food Insecurity: Not on file   Transportation Needs: Not on file   Physical Activity: Not on file   Stress: Not on file   Social Connections: Not on file   Intimate Partner Violence: Not on file   Housing Stability: Not on file       Family History:   History reviewed. No pertinent family history. REVIEW OF SYSTEMS:     Constitutional: Denies fevers, chills, night sweats, and fatigue. Intermittent hot flashes  HEENT: Denies headaches, nose bleeds, and blurred vision,oral pain, abscess or lesion. Musculoskeletal: Denies falls, pain to BLE with ambulation and edema to BLE. Neurological: Denies dizziness and lightheadedness, numbness and tingling  Cardiovascular: Denies chest pain, palpitations, and feelings of heart racing. Respiratory: Denies orthopnea and PND  Gastrointestinal: Denies heartburn, nausea/vomiting, diarrhea and constipation, black/bloody, and tarry stools. Genitourinary: Denies dysuria and hematuria  Hematologic: Denies excessive bruising or bleeding  Lymphatic: Denies lumps and bumps to neck, axilla, breast, and groin  Endocrine: Denies excessive thirst. Denies intolerance to hot and cold  GYN: Postmenopausal state; Denies vaginal bleeding. Psychiatric: Denies anxiety and depression. PHYSICAL EXAM:   /62   Pulse 70   Temp 97.7 °F (36.5 °C)   Resp 18   Wt 259 lb 3.2 oz (117.6 kg)   SpO2 100%   BMI 45.92 kg/m²   CONST:  Well developed, morbidly obese 79-year-old  female who appears stated age. Awake, alert, cooperative, no apparent distress  HEENT:   Head- Normocephalic, atraumatic   Eyes- Conjunctivae pink, anicteric  Throat- Oral mucosa pink and moist  Neck-  No stridor, trachea midline, no jugular venous distention. No adenopathy   CHEST: Chest symmetrical and non-tender to palpation. No accessory muscle use or intercostal retractions  RESPIRATORY: Lung sounds - clear throughout fields   CARDIOVASCULAR:     No carotid bruit  Heart Inspection- shows no noted pulsations  Heart Palpation- no heaves or thrills; PMI is non-displaced   Heart Ausculation- Regular rate and rhythm, no murmur. No s3, s4 or rub   PV: No lower extremity edema. No varicosities. Pedal pulses palpable, no clubbing or cyanosis   ABDOMEN: Soft, non-tender to light palpation. Bowel sounds present. No palpable masses no organomegaly; no abdominal bruit  MS: Good muscle strength and tone. No atrophy or abnormal movements. : Deferred  SKIN: Warm and dry no statis dermatitis or ulcers   NEURO / PSYCH: Oriented to person, place and time. Speech clear and appropriate. Follows all commands. Pleasant affect     DATA:    ECG: Supraventricular tachycardia, vent rate 200, QRS duration 66, QTc 397.   Tele strips: NSR, 98 heart rate  Diagnostic:    Labs:   CBC:   Recent Labs     11/04/22  0400 11/04/22 2000   WBC 16.0* 16.8*   HGB 10.1* 10.3*   HCT 32.3* 32.4*    313 BMP:   Recent Labs     11/04/22  0400 11/04/22 2000    138   K 4.7 4.1   CO2 22 22   BUN 19 30*   CREATININE 1.0 1.2*   LABGLOM 60 48   CALCIUM 8.8 9.2     Mag:   Recent Labs     11/04/22 2000   MG 2.0       TSH:   Recent Labs     11/04/22 2000   TSH 2.620       LIVER PROFILE:  Recent Labs     11/04/22 2000   AST 29   ALT 13   LABALBU 3.9      Latest Reference Range & Units 11/4/22 20:05   Troponin, High Sensitivity 0 - 9 ng/L 39 (H)   (H): Data is abnormally high    CXR:  Impression   No acute cardiopulmonary process. VQ scan:  Impression   Low probability of pulmonary embolism. Assessment and plan to follow as per Dr Lonnie Barrett. Electronically signed by CHRISTO Gutiérrez CNP on 11/5/2022 at 2:57 PM      I independently interviewed and examined the patient. I have reviewed the above documentation completed by the SAMARIA. Please see my additional contributions to the HPI, physical exam, and assessment / medical decision making. Contributed more than 51% of the patient care. Briefly, 80-year-old female with history of hypertension, hyperlipidemia, thyroid disease, arthritis, history of breast cyst removal who initially presented for left knee surgery and surgery must have gone fine without any complication but on November 4, 2022, patient was noted to be in SVT requiring a 12 mg adenosine initially which did not convert the SVT and a subsequent dose of 80 mg converted the SVT to sinus tachycardia but SVT reoccurred requiring additional dose of 18 mg adenosine followed by diltiazem drip. She has since converted to sinus rhythm and a VQ scan revealed low probability. Her TSH has noted to be fine and electrolytes with magnesium noted to be 2 and potassium of 4. Troponin is 39 but patient denies any symptoms (denies chest pain, orthopnea or paroxysmal nocturnal dyspnea or palpitation, dizziness, lightheadedness or fall).   It is reported patient had a fall while hospitalized but it was noted to be mechanical fall in nature sustaining a torn tendon which orthopedic surgery is managing. Cardiology consulted for SVT. Review of Systems:  Cardiac: As per HPI  General: No fever, chills  Pulmonary: As per HPI  GI: No nausea, vomiting  Musculoskeletal: DOLL x 4, no focal motor deficits  Skin: Intact, no rashes  Neuro/Psych: No headache or seizures    Physical Exam:  /62   Pulse 88   Temp 98.4 °F (36.9 °C)   Resp 18   Wt 259 lb 3.2 oz (117.6 kg)   SpO2 95%   BMI 45.92 kg/m²   Appearance: Awake, alert, no acute respiratory distress  Skin: Intact, no rash  Head: Normocephalic, atraumatic  ENMT: MMM, no rhinorrhea  Neck: Supple, no carotid bruits  Lungs: Clear to auscultation bilaterally. No wheezes, rales, or rhonchi.   Cardiac: Regular rate and rhythm, +S1S2, no murmurs apparent  Abdomen: Soft, +bowel sounds  Extremities: Moves all extremities x 4, no lower extremity edema  Neurologic: No focal motor deficits apparent, normal mood and affect      Assessment/Plan:     SVT  Converted with adenosine and diltiazem drip  Has been asymptomatic  Am going to initiate low-dose beta-blocker with metoprolol succinate 25 mg daily but please hold this medication if systolic blood pressure is less than 100  Wean off diltiazem drip and uptitrate beta-blocker for optimal rate control  Consider holding losartan and utilize beta-blocker for rate control and hypertension  Awaiting echocardiogram to guide therapy    Hypertension  Am holding losartan as we uptitrate beta-blocker for optimal rate control  It is reported patient is on clonidine as needed at home and I recommend discontinuation of this medication given its significant rebound hypertension  When blood pressure is stable resume losartan    Mild troponin leak  Awaiting echo to guide therapy  Currently asymptomatic  Echo shows normal systolic function patient could undergo outpatient ischemic evaluation with Lexiscan myocardial perfusion stress test    Left knee surgery  Management per surgery    Lipidemia  Continue on statin therapy    Thyroid disease  Management per primary        Lucius Frost MD  [facility] ([facility]) Cardiology

## 2022-11-05 NOTE — PROGRESS NOTES
Nurse at bedside patient heart rate increased to 204, BP 77/44. RRT called for a second time. Dr. Altaf Barakat at bedside. EKG showing SVT. Verbal order for 18mg IV adenosine at this time and Cardizem 20 mg IV. ICU nurse at bedside pushed med's. HR now in 110, goal to keep patient HR below 110. Patient transferred to [facility].

## 2022-11-05 NOTE — PROGRESS NOTES
Physical Therapy  Facility/Department: [facility]  Daily Treatment Note  NAME: Gretel Huynh Masters  : 1950  MRN: [id number]    Date of Service: 2022    Patient Diagnosis(es): The primary encounter diagnosis was Post-operative pain. A diagnosis of Primary osteoarthritis of left knee was also pertinent to this visit. Referring provider:  Lisbeth Barron MD     PT Order:  PT eval and treat      Evaluating PT:  Abbe Noe PT, DPT PT [id number]     Room #:  2619/7520-C  Diagnosis:  Primary osteoarthritis of left knee [M17.12]  Osteoarthritis of left knee, unspecified osteoarthritis type [M17.12]  This am 22 while walking with staff her L knee gave out and she fell with L knee flexing under her and she was lowered to the floor. Pt was told she ruptured her patellar tendon and will have to have surgery in the future. Procedure/Surgery:  left TKA  Precautions:  fall risk, WBAT left LE, drain, R knee FLEX worse than L knee FLEX, new 22 L knee immobilizer  Equipment Needs:  none. Pt reported owning a walker and cane     SUBJECTIVE:     Pt lives with her  in a split level home with 4+1 stairs to enter and 2 rail. Once in the home, pt has to do another 7 steps with 2 rails to get to the main living area, bed and bathroom. Pt ambulated with cane PTA. OBJECTIVE:    Initial Evaluation  Date: 11/3 Treatment   Short Term/ Long Term   Goals   Was pt agreeable to Eval/treatment? yes yes     Does pt have pain? Aching in left knee during ambulation. Aching in left knee     Bed Mobility  Rolling: NT  Supine to sit: SBA  Sit to supine: NT  Scooting: SBA to sitting EOB Rolling:  NT  Supine to sit:  Min A  Sit to supine:  NT  Scooting:  Supervision to sitting EOB supervision   Transfers Sit to stand: Min A  Stand to sit: Min A  Stand pivot: NT Sit to stand: Mod A x 2  Stand to sit:  Min A  Stand pivot:  NT supervision   Ambulation    15 feet and 150 feet with w/w CGA. 90 feet with w/w SBA. Chair followed for safety. 200+ feet with w/w supervision    Stair negotiation: ascended and descended  NT  NA 8 steps with 2 rails SBA   AM-PAC 6 Clicks 83/65 58/97        Patient education  Pt educated on PT objectives during treatment session, hand placement during transfers, walker usage and safety. Patient response to education:   Pt verbalized understanding Pt demonstrated skill Pt requires further education in this area   yes With cueing yes     ASSESSMENT:    Comments:  Pt found in bed and left sitting up in the chair with call light in reach and B LE elevated. Treatment:  Patient practiced and was instructed in the following treatment:   Functional mobility performed as documented above. No report of dizziness during functional mobility. Bed needed to be elevated to assist with it to stand transfers. Pt with much difficulty getting to standing position since left knee is in a knee immobilizer and right knee is lacking functional range knee flexion. No LOB during ambulation. Pt ambulates with slow gait speed and step to pattern. Knee brace needed to be adjusted during ambulation since it slid down. PLAN:    Patient is making good progress towards established goals. Will continue with current POC.      Time in  0942  Time out  1014    Total Treatment Time  32 minutes     CPT codes:    [x] Therapeutic activities [id number] 32 minutes  [] Therapeutic exercises [id number]  minutes      Lito Donovan, [address]

## 2022-11-05 NOTE — PROGRESS NOTES
Jtet Hernandez Hospitalist   Progress Note    Admitting Date and Time: 11/3/2022  7:26 AM  Admit Dx: Primary osteoarthritis of left knee [M17.12]  Osteoarthritis of left knee, unspecified osteoarthritis type [M17.12]    Subjective: Following for medical management. Patient improving well after total knee arthroplasty, did go into SVT during night, as per patient this is completely new, also says when all this started 8 PM last evening she was at rest, patient did require adenosine then Cardizem drip eventually converted to sinus rhythm. Echo ordered during night is pending    Patient was admitted with Primary osteoarthritis of left knee [M17.12]  Osteoarthritis of left knee, unspecified osteoarthritis type [M17.12]. At this time patient is awake, alert, sitting in chair, not in distress, does communicate well. Per RN: No new complaints. Patient is off Cardizem drip. ROS: denies fever, chills, cp, sob, n/v, HA unless stated above.      Vitamin D  5,000 Units Oral Daily    levothyroxine  75 mcg Oral Daily    losartan  100 mg Oral Daily    pravastatin  10 mg Oral Nightly    vitamin C  1,000 mg Oral Daily    zinc sulfate  50 mg Oral Daily    sodium chloride flush  5-40 mL IntraVENous 2 times per day    acetaminophen  650 mg Oral Q6H    sennosides-docusate sodium  1 tablet Oral BID    famotidine  20 mg Oral BID    Or    famotidine (PEPCID) injection  20 mg IntraVENous BID    aspirin  81 mg Oral BID     perflutren lipid microspheres, 1.5 mL, ONCE PRN  sodium chloride flush, 5-40 mL, PRN  sodium chloride, , PRN  ondansetron, 4 mg, Q8H PRN   Or  ondansetron, 4 mg, Q6H PRN  oxyCODONE, 5 mg, Q4H PRN  morphine, 2 mg, Q4H PRN  diphenhydramine, 25 mg, Q6H PRN   Or  diphenhydrAMINE, 25 mg, Q6H PRN       Objective:    /62   Pulse 70   Temp 97.7 °F (36.5 °C)   Resp 18   Wt 259 lb 3.2 oz (117.6 kg)   SpO2 100%   BMI 45.92 kg/m²   General Appearance: alert and oriented to person, place and time, well-developed and well-nourished, in no acute distress  Skin: warm and dry, no rash or erythema  Head: normocephalic and atraumatic  Eyes: pupils equal, round, and reactive to light, extraocular eye movements intact, conjunctivae normal  ENT: tympanic membrane, external ear and ear canal normal bilaterally, oropharynx clear and moist with normal mucous membranes  Neck: neck supple and non tender without mass, no thyromegaly or thyroid nodules, no cervical lymphadenopathy   Pulmonary/Chest: clear to auscultation bilaterally- no wheezes, rales or rhonchi, normal air movement, no respiratory distress  Cardiovascular: normal rate, normal S1 and S2, no gallops, intact distal pulses, and no carotid bruits  Abdomen: soft, non-tender, non-distended, normal bowel sounds, no masses or organomegaly      Recent Labs     11/04/22 0400 11/04/22 2000    138   K 4.7 4.1    103   CO2 22 22   BUN 19 30*   CREATININE 1.0 1.2*   GLUCOSE 156* 130*   CALCIUM 8.8 9.2         Recent Labs     11/04/22 0400 11/04/22 2000   WBC 16.0* 16.8*   RBC 3.70 3.70   HGB 10.1* 10.3*   HCT 32.3* 32.4*   MCV 87.3 87.6   MCH 27.3 27.8   MCHC 31.3* 31.8*   RDW 15.2* 15.6*    313   MPV 9.8 9.7       Hemoglobin 10.1, down from 13.7    Radiology:   XR CHEST PORTABLE   Final Result   No acute cardiopulmonary process. XR KNEE LEFT (1-2 VIEWS)   Final Result   New pronounced patella [place] suggesting injury to the patellar tendon. XR KNEE LEFT (1-2 VIEWS)   Final Result   Status post left total knee arthroplasty in expected anatomic alignment             Assessment:    Principal Problem:    Osteoarthritis of left knee, unspecified osteoarthritis type  Active Problems:    SVT (supraventricular tachycardia) (HCC)  Resolved Problems:    * No resolved hospital problems. *      Plan:  Left knee total arthroplasty, management as per the primary service. Patient improving well.   SVT, requiring adenosine as well as Cardizem drip, creatinine 1.2, unclear etiology, would like to evaluate for possible PE leading to uncontrolled heart rate. Will get VQ scan if can be done. Ordered after verifying with nuclear medicine. Hypertension, patient is on losartan at home, this has been resumed. Controlled. Hypothyroidism, patient is stable on 75 MCG daily of Synthroid. Hyperlipidemia, patient is on statins. DVT prophylaxis, patient remains on baby aspirin twice daily. Pain is controlled at this time. Anemia, to certain extent postoperative drop, will observe. DC planning, by primary service, depending upon echo results, results of VQ scan, cardiology input, then will be okay from general medicine, can happen as early as later today or tomorrow.         Electronically signed by Josephine Barrera MD on 11/5/2022 at 10:53 AM

## 2022-11-05 NOTE — PROGRESS NOTES
Total Joint - L TKA    Post op Day 2      S:  Fall yesterday in the hospital with hyperflexion of the knee. Lowered to ground by staff. Post event radiographs demonstrate change in implant positioning with marked proximal migration of the patella. Knee immobilizer applied. Able to weight-bear. Ambulating with walker. Transferred to 6 floor due to tachycardia. O:     Vitals:    11/05/22 0742   BP: 114/62   Pulse: 70   Resp:    Temp: 97.7 °F (36.5 °C)   SpO2: 100%        Dressing c/d/i   Unable to straight leg raise. Calf - soft/nontender     H/H:   Recent Labs     11/04/22 2000   HGB 10.3*   HCT 32.4*        PT/INR:   Recent Labs     11/04/22 2000   PROT 6.2*       A/P:   Acute patellar tendon disruption status post left TKA. The nature and prognosis of the diagnosis was discussed. Will need additional surgical intervention. Planning for autograft augmentation of the tendon in the next 2 to 4 weeks. From an Ortho standpoint she can be discharged home and receive home health care services.     Tachycardia-awaiting VQ scan    Con't PT/OT-weightbearing as tolerated with knee immobilizer     Dressing change postoperative day #7               ASA postoperative DVT prophylaxis     D/C planning

## 2022-11-05 NOTE — PROGRESS NOTES
RRT initiated by this RN for HR in 190s-200s and sustaining during vital checks, BP 90s/60s. Dr. Addie Hansen at bedside, EKG obtained at this time showing SVT. Verbal order for 12mg IV adenosine at this time. IV access infiltrated immediately after starting adenosine push, HR still in 190s as second EKG shows. Second IV access obtained, 18mg more of IV adenosine ordered by  and ICU at bedside pushed all 18mg into new access at this time. Blood work also obtained at this time. Pt HR now in 120s, see new order for 500cc bolus x1 and transfer to [address] Dr. Leann Felix and patient's  updated after completion of RRT.

## 2022-11-06 VITALS
BODY MASS INDEX: 44.99 KG/M2 | RESPIRATION RATE: 16 BRPM | DIASTOLIC BLOOD PRESSURE: 57 MMHG | WEIGHT: 254 LBS | SYSTOLIC BLOOD PRESSURE: 123 MMHG | HEART RATE: 82 BPM | TEMPERATURE: 97.1 F | OXYGEN SATURATION: 94 %

## 2022-11-06 LAB
ANION GAP SERPL CALCULATED.3IONS-SCNC: 9 MMOL/L (ref 7–16)
BUN BLDV-MCNC: 16 MG/DL (ref 6–23)
CALCIUM SERPL-MCNC: 9.3 MG/DL (ref 8.6–10.2)
CHLORIDE BLD-SCNC: 105 MMOL/L (ref 98–107)
CO2: 26 MMOL/L (ref 22–29)
CREAT SERPL-MCNC: 0.7 MG/DL (ref 0.5–1)
GFR SERPL CREATININE-BSD FRML MDRD: >60 ML/MIN/1.73
GLUCOSE BLD-MCNC: 106 MG/DL (ref 74–99)
HCT VFR BLD CALC: 31.1 % (ref 34–48)
HEMOGLOBIN: 9.7 G/DL (ref 11.5–15.5)
MCH RBC QN AUTO: 27.3 PG (ref 26–35)
MCHC RBC AUTO-ENTMCNC: 31.2 % (ref 32–34.5)
MCV RBC AUTO: 87.6 FL (ref 80–99.9)
PDW BLD-RTO: 15.9 FL (ref 11.5–15)
PLATELET # BLD: 256 E9/L (ref 130–450)
PMV BLD AUTO: 10 FL (ref 7–12)
POTASSIUM SERPL-SCNC: 4.1 MMOL/L (ref 3.5–5)
RBC # BLD: 3.55 E12/L (ref 3.5–5.5)
SODIUM BLD-SCNC: 140 MMOL/L (ref 132–146)
WBC # BLD: 9.4 E9/L (ref 4.5–11.5)

## 2022-11-06 PROCEDURE — G0378 HOSPITAL OBSERVATION PER HR: HCPCS

## 2022-11-06 PROCEDURE — 6370000000 HC RX 637 (ALT 250 FOR IP): Performed by: ORTHOPAEDIC SURGERY

## 2022-11-06 PROCEDURE — 2580000003 HC RX 258: Performed by: ORTHOPAEDIC SURGERY

## 2022-11-06 PROCEDURE — 2700000000 HC OXYGEN THERAPY PER DAY

## 2022-11-06 PROCEDURE — 6370000000 HC RX 637 (ALT 250 FOR IP): Performed by: INTERNAL MEDICINE

## 2022-11-06 PROCEDURE — 85027 COMPLETE CBC AUTOMATED: CPT

## 2022-11-06 PROCEDURE — 99226 PR SBSQ OBSERVATION CARE/DAY 35 MINUTES: CPT | Performed by: INTERNAL MEDICINE

## 2022-11-06 PROCEDURE — 80048 BASIC METABOLIC PNL TOTAL CA: CPT

## 2022-11-06 PROCEDURE — 36415 COLL VENOUS BLD VENIPUNCTURE: CPT

## 2022-11-06 PROCEDURE — 96361 HYDRATE IV INFUSION ADD-ON: CPT

## 2022-11-06 PROCEDURE — 97530 THERAPEUTIC ACTIVITIES: CPT

## 2022-11-06 RX ORDER — METOPROLOL SUCCINATE 25 MG/1
25 TABLET, EXTENDED RELEASE ORAL DAILY
Qty: 30 TABLET | Refills: 3 | Status: ON HOLD | OUTPATIENT
Start: 2022-11-07

## 2022-11-06 RX ADMIN — METOPROLOL SUCCINATE 25 MG: 25 TABLET, EXTENDED RELEASE ORAL at 08:23

## 2022-11-06 RX ADMIN — ACETAMINOPHEN 650 MG: 325 TABLET ORAL at 13:05

## 2022-11-06 RX ADMIN — ZINC SULFATE 220 MG (50 MG) CAPSULE 50 MG: CAPSULE at 08:23

## 2022-11-06 RX ADMIN — ACETAMINOPHEN 650 MG: 325 TABLET ORAL at 06:28

## 2022-11-06 RX ADMIN — ASPIRIN 81 MG: 81 TABLET, COATED ORAL at 08:23

## 2022-11-06 RX ADMIN — OXYCODONE HYDROCHLORIDE AND ACETAMINOPHEN 1000 MG: 500 TABLET ORAL at 08:22

## 2022-11-06 RX ADMIN — DOCUSATE SODIUM AND SENNOSIDES 1 TABLET: 8.6; 5 TABLET, FILM COATED ORAL at 08:22

## 2022-11-06 RX ADMIN — ACETAMINOPHEN 650 MG: 325 TABLET ORAL at 00:14

## 2022-11-06 RX ADMIN — FAMOTIDINE 20 MG: 20 TABLET ORAL at 08:23

## 2022-11-06 RX ADMIN — LEVOTHYROXINE SODIUM 75 MCG: 75 TABLET ORAL at 06:28

## 2022-11-06 RX ADMIN — Medication 5000 UNITS: at 08:22

## 2022-11-06 RX ADMIN — SODIUM CHLORIDE, PRESERVATIVE FREE 10 ML: 5 INJECTION INTRAVENOUS at 08:23

## 2022-11-06 ASSESSMENT — PAIN DESCRIPTION - DESCRIPTORS
DESCRIPTORS: ACHING

## 2022-11-06 ASSESSMENT — PAIN DESCRIPTION - ORIENTATION: ORIENTATION: LEFT

## 2022-11-06 ASSESSMENT — PAIN SCALES - GENERAL
PAINLEVEL_OUTOF10: 2
PAINLEVEL_OUTOF10: 2
PAINLEVEL_OUTOF10: 3

## 2022-11-06 ASSESSMENT — PAIN DESCRIPTION - LOCATION: LOCATION: LEG

## 2022-11-06 ASSESSMENT — PAIN - FUNCTIONAL ASSESSMENT: PAIN_FUNCTIONAL_ASSESSMENT: PREVENTS OR INTERFERES SOME ACTIVE ACTIVITIES AND ADLS

## 2022-11-06 NOTE — PROGRESS NOTES
[facility] Hospitalist   Progress Note    Admitting Date and Time: 11/3/2022  7:26 AM  Admit Dx: Primary osteoarthritis of left knee [M17.12]  Osteoarthritis of left knee, unspecified osteoarthritis type [M17.12]    Subjective: Following for medical management. Patient improving well after total knee arthroplasty, did go into SVT during night, as per patient this is completely new, also says when all this started 8 PM last evening she was at rest, patient did require adenosine then Cardizem drip eventually converted to sinus rhythm. Echo ordered did show normal systolic function, VQ scan with low probability, cardiology started low-dose beta-blocker, do recommend outpatient ischemic evaluation with Lexiscan. Postoperative course also complicated with acute patellar tendon disruption. Planned for autograft augmentation of tendon in the next 2 to 4 weeks. Patient was admitted with Primary osteoarthritis of left knee [M17.12]  Osteoarthritis of left knee, unspecified osteoarthritis type [M17.12]. At this time patient is awake, alert, not in distress, does communicate well. Does feel a lot better, patient was admitted due to results of the VQ scan, was also explained why CTA chest was not done, patient also knows now kidney functions are back within normal limits. Per RN: No new complaints. Patient is off Cardizem drip.     ROS: denies fever, chills, cp, sob, n/v, HA unless stated above.     metoprolol succinate  25 mg Oral Daily    Vitamin D  5,000 Units Oral Daily    levothyroxine  75 mcg Oral Daily    [Held by provider] losartan  100 mg Oral Daily    pravastatin  10 mg Oral Nightly    vitamin C  1,000 mg Oral Daily    zinc sulfate  50 mg Oral Daily    sodium chloride flush  5-40 mL IntraVENous 2 times per day    acetaminophen  650 mg Oral Q6H    sennosides-docusate sodium  1 tablet Oral BID    famotidine  20 mg Oral BID    Or    famotidine (PEPCID) injection  20 mg IntraVENous BID aspirin  81 mg Oral BID     perflutren lipid microspheres, 1.5 mL, ONCE PRN  sodium chloride flush, 5-40 mL, PRN  sodium chloride, , PRN  ondansetron, 4 mg, Q8H PRN   Or  ondansetron, 4 mg, Q6H PRN  oxyCODONE, 5 mg, Q4H PRN  morphine, 2 mg, Q4H PRN  diphenhydramine, 25 mg, Q6H PRN   Or  diphenhydrAMINE, 25 mg, Q6H PRN       Objective:    BP (!) 145/53   Pulse 93   Temp 98.8 °F (37.1 °C) (Oral)   Resp 18   Wt 254 lb (115.2 kg)   SpO2 96%   BMI 44.99 kg/m²   General Appearance: alert and oriented to person, place and time, well-developed and well-nourished, in no acute distress  Skin: warm and dry, no rash or erythema  Head: normocephalic and atraumatic  Eyes: pupils equal, round, and reactive to light, extraocular eye movements intact, conjunctivae normal  ENT: tympanic membrane, external ear and ear canal normal bilaterally, oropharynx clear and moist with normal mucous membranes  Neck: neck supple and non tender without mass, no thyromegaly or thyroid nodules, no cervical lymphadenopathy   Pulmonary/Chest: clear to auscultation bilaterally- no wheezes, rales or rhonchi, normal air movement, no respiratory distress  Cardiovascular: normal rate, normal S1 and S2, no gallops, intact distal pulses, and no carotid bruits  Abdomen: soft, non-tender, non-distended, normal bowel sounds, no masses or organomegaly      Recent Labs     11/04/22 0400 11/04/22 2000 11/06/22  0648    138 140   K 4.7 4.1 4.1    103 105   CO2 22 22 26   BUN 19 30* 16   CREATININE 1.0 1.2* 0.7   GLUCOSE 156* 130* 106*   CALCIUM 8.8 9.2 9.3         Recent Labs     11/04/22 0400 11/04/22 2000 11/06/22  0648   WBC 16.0* 16.8* 9.4   RBC 3.70 3.70 3.55   HGB 10.1* 10.3* 9.7*   HCT 32.3* 32.4* 31.1*   MCV 87.3 87.6 87.6   MCH 27.3 27.8 27.3   MCHC 31.3* 31.8* 31.2*   RDW 15.2* 15.6* 15.9*    313 256   MPV 9.8 9.7 10.0       Hemoglobin 10.1, down from 13.7    Radiology:   NM LUNG SCAN PERFUSION ONLY   Final Result   Low probability of pulmonary embolism. XR CHEST PORTABLE   Final Result   No acute cardiopulmonary process. XR KNEE LEFT (1-2 VIEWS)   Final Result   New pronounced patella [place] suggesting injury to the patellar tendon. XR KNEE LEFT (1-2 VIEWS)   Final Result   Status post left total knee arthroplasty in expected anatomic alignment             Assessment:    Principal Problem:    Osteoarthritis of left knee, unspecified osteoarthritis type  Active Problems:    SVT (supraventricular tachycardia) (HCC)    Post-operative pain  Resolved Problems:    * No resolved hospital problems. *      Plan:  Left knee total arthroplasty, management as per the primary service. Patient improving well. Okay from them. SVT, requiring adenosine as well as Cardizem drip, creatinine 1.2, unclear etiology, would like to evaluate for possible PE leading to uncontrolled heart rate. VQ scan low probability. .  Hypertension, patient is on losartan at home, this has been resumed. Controlled. Patient evaluated by cardiology, started on Toprol, losartan is On hold, BP well controlled with current change. Hypothyroidism, patient is stable on 75 MCG daily of Synthroid. Hyperlipidemia, patient is on statins. DVT prophylaxis, patient remains on baby aspirin twice daily. Pain is controlled at this time. Anemia, to certain extent postoperative drop, will observe. Has continued to drop, now 9.7, partly also dilutional, will not do any additional inpatient work-up, however will need repeat CBC within 5 days in outpatient setting to check the hemoglobin. DC planning, okay from general medicine, will hold losartan on Lasix, continue Toprol, will need H&H in 5 to 7 days with follow-up with PCP in case medication for hypertension needs to be adjusted. Nursing updated to notify primary service.         Electronically signed by Shanta Cabezas MD on 11/6/2022 at 8:02 AM

## 2022-11-06 NOTE — PROGRESS NOTES
Physical Therapy  Facility/Department: [facility] MED SURG  Daily Treatment Note  NAME: Jesús Castellanos Masters  : 1950  MRN: [id number]    Date of Service: 2022    Patient Diagnosis(es): The primary encounter diagnosis was Post-operative pain. A diagnosis of Primary osteoarthritis of left knee was also pertinent to this visit. Referring provider:  Batsheva Madrigal MD     PT Order:  PT eval and treat      Evaluating PT:  Berry Robles PT, DPT PT [id number]     Room #:  0438/9574-R  Diagnosis:  Primary osteoarthritis of left knee [M17.12]  Osteoarthritis of left knee, unspecified osteoarthritis type [M17.12]  This am 22 while walking with staff her L knee gave out and she fell with L knee flexing under her and she was lowered to the floor. Pt was told she ruptured her patellar tendon and will have to have surgery in the future. Procedure/Surgery:  left TKA  Precautions:  fall risk, WBAT left LE, drain, R knee FLEX worse than L knee FLEX, new 22 L knee immobilizer  Equipment Needs:  none. Pt reported owning a walker and cane     SUBJECTIVE:     Pt lives with her  in a split level home with 4+1 stairs to enter and 2 rail. Once in the home, pt has to do another 7 steps with 2 rails to get to the main living area, bed and bathroom. Pt ambulated with cane PTA. OBJECTIVE:    Initial Evaluation  Date: 11/3 Treatment   Short Term/ Long Term   Goals   Was pt agreeable to Eval/treatment? yes yes     Does pt have pain? Aching in left knee during ambulation. Left knee     Bed Mobility  Rolling: NT  Supine to sit: SBA  Sit to supine: NT  Scooting: SBA to sitting EOB NT- pt sitting on the EOB supervision   Transfers Sit to stand: Min A  Stand to sit: Min A  Stand pivot: NT Sit to stand: Min A x 2  Stand to sit:  Min A  Stand pivot:  NT supervision   Ambulation    15 feet and 150 feet with w/w CGA. 90 feet with w/w SBA.    200+ feet with w/w supervision    Stair negotiation: ascended and descended  NT NA 8 steps with 2 rails SBA   AM-PAC 6 Clicks 28/63 95/26        Patient education  Pt educated on PT objectives during treatment session, position of brace, discussed stair negotiation. Patient response to education:   Pt verbalized understanding Pt demonstrated skill Pt requires further education in this area   yes   yes     ASSESSMENT:    Comments:  Pt found and left sitting on the EOB with call light in reach and her  present. Offered multi times to take pt to the 7th floor therapy room to practice stair negotiation. Pt reported she did not need to practice the steps and that she would have a lot of assistance getting into the house. Verbalized sequencing of stair negotiation to pt and  and both verbalized understanding. Treatment:  Patient practiced and was instructed in the following treatment:   Functional mobility performed as documented above. No report of dizziness during functional mobility. No LOB during ambulation. Towards the end of ambulation, knee brace slid down her leg and needed to be repositioned. PLAN:    Patient is making good progress towards established goals. Will continue with current POC.      Time in  1145  Time out  1203    Total Treatment Time  18 minutes     CPT codes:    [x] Therapeutic activities 46855 18 minutes  [] Therapeutic exercises 68691  minutes      Ally Montez, [phone]

## 2022-11-07 LAB
EKG ATRIAL RATE: 197 BPM
EKG ATRIAL RATE: 88 BPM
EKG P AXIS: 56 DEGREES
EKG P-R INTERVAL: 176 MS
EKG Q-T INTERVAL: 218 MS
EKG Q-T INTERVAL: 368 MS
EKG QRS DURATION: 66 MS
EKG QRS DURATION: 80 MS
EKG QTC CALCULATION (BAZETT): 397 MS
EKG QTC CALCULATION (BAZETT): 445 MS
EKG R AXIS: -10 DEGREES
EKG R AXIS: 9 DEGREES
EKG T AXIS: 148 DEGREES
EKG T AXIS: 48 DEGREES
EKG VENTRICULAR RATE: 200 BPM
EKG VENTRICULAR RATE: 88 BPM

## 2022-11-07 PROCEDURE — 93010 ELECTROCARDIOGRAM REPORT: CPT | Performed by: INTERNAL MEDICINE

## 2022-11-28 ENCOUNTER — PREP FOR PROCEDURE (OUTPATIENT)
Dept: ORTHOPEDIC SURGERY | Age: 72
End: 2022-11-28

## 2022-11-28 RX ORDER — ACETAMINOPHEN 325 MG/1
1000 TABLET ORAL ONCE
Status: CANCELLED | OUTPATIENT
Start: 2022-11-28 | End: 2022-11-28

## 2022-11-28 RX ORDER — SODIUM CHLORIDE 0.9 % (FLUSH) 0.9 %
5-40 SYRINGE (ML) INJECTION PRN
Status: CANCELLED | OUTPATIENT
Start: 2022-11-28

## 2022-11-28 RX ORDER — SODIUM CHLORIDE 9 MG/ML
INJECTION, SOLUTION INTRAVENOUS PRN
Status: CANCELLED | OUTPATIENT
Start: 2022-11-28

## 2022-11-28 RX ORDER — SODIUM CHLORIDE 0.9 % (FLUSH) 0.9 %
5-40 SYRINGE (ML) INJECTION EVERY 12 HOURS SCHEDULED
Status: CANCELLED | OUTPATIENT
Start: 2022-11-28

## 2022-11-28 RX ORDER — SODIUM CHLORIDE, SODIUM LACTATE, POTASSIUM CHLORIDE, CALCIUM CHLORIDE 600; 310; 30; 20 MG/100ML; MG/100ML; MG/100ML; MG/100ML
INJECTION, SOLUTION INTRAVENOUS CONTINUOUS
Status: CANCELLED | OUTPATIENT
Start: 2022-11-28

## 2022-11-29 NOTE — PROGRESS NOTES
[facility] PRE-ADMISSION TESTING INSTRUCTIONS    The Preadmission Testing patient is instructed accordingly using the following criteria (check applicable):    ARRIVAL INSTRUCTIONS:  [x] Parking the day of Surgery is located in the Main Entrance lot. Upon entering the door, make an immediate right to the surgery reception desk    [x] Bring photo ID and insurance card    [] Bring in a copy of Living will or Durable Power of  papers. [x] Please be sure to arrange for responsible adult to provide transportation to and from the hospital    [x] Please arrange for responsible adult to be with you for the 24 hour period post procedure due to having anesthesia    [x] If you awake am of surgery not feeling well or have temperature >100 please call [phone]    GENERAL INSTRUCTIONS:    [x] Nothing by mouth after midnight, including gum, candy, mints or water    [x] You may brush your teeth, but do not swallow any water    [x] Take medications as instructed with 1-2 oz of water    [x] Stop herbal supplements and vitamins 5 days prior to procedure    [x] Follow preop dosing of blood thinners per physician instructions    [] Take 1/2 dose of evening insulin, but no insulin after midnight    [] No oral diabetic medications after midnight    [] If diabetic and have low blood sugar or feel symptomatic, take 1-2oz apple juice only    [] Bring inhalers day of surgery    [] Bring C-PAP/ Bi-Pap day of surgery    [] Bring urine specimen day of surgery    [x] Shower or bath with soap, lather and rinse well, AM of Surgery, no lotion, powders or creams to surgical site    [] Follow bowel prep as instructed per surgeon    [x] No tobacco products within 24 hours of surgery     [x] No alcohol or illegal drug use within 24 hours of surgery.     [x] Jewelry, body piercing's, eyeglasses, contact lenses and dentures are not permitted into surgery (bring cases)      [x] Please do not wear any nail polish, make up or hair products on the day of surgery    [x] You can expect a call the business day prior to procedure to notify you if your arrival time changes    [x] If you receive a survey after surgery we would greatly appreciate your comments    [] Parent/guardian of a minor must accompany their child and remain on the premises  the entire time they are under our care     [] Pediatric patients may bring favorite toy, blanket or comfort item with them    [] A caregiver or family member must remain with the patient during their stay if they are mentally handicapped, have dementia, disoriented or unable to use a call light or would be a safety concern if left unattended    [x] Please notify surgeon if you develop any illness between now and time of surgery (cold, cough, sore throat, fever, nausea, vomiting) or any signs of infections  including skin, wounds, and dental.    [x]  The Outpatient Pharmacy is available to fill your prescription here on your day of surgery, ask your preop nurse for details    [] Other instructions    EDUCATIONAL MATERIALS PROVIDED:    [] PAT Preoperative Education Packet/Booklet     [] Medication List    [] Transfusion bracelet applied with instructions    [] Shower with soap, lather and rinse well, and use CHG wipes provided the evening before surgery as instructed    [] Incentive spirometer with instructions

## 2022-11-30 ENCOUNTER — ANESTHESIA EVENT (OUTPATIENT)
Dept: OPERATING ROOM | Age: 72
End: 2022-11-30
Payer: MEDICARE

## 2022-11-30 NOTE — DISCHARGE SUMMARY
Physician Discharge Summary     Patient ID:  Erik Hall  [id number]  51 y.o.  1950    Admit date: 11/3/2022    Discharge date and time: 11/6/2022  1:55 PM     Admitting Physician: Mark Adame MD     Surgeon: Penny Woods. Darron Mejia M.D. Admission Diagnoses: Primary osteoarthritis of left knee [M17.12]  Osteoarthritis of left knee, unspecified osteoarthritis type [M17.12]    Discharge Diagnoses: Left Total  Knee Arthroplasty; Extensor Mechanism Rupture    Discharged Condition: stable    Hospital Course:  POD#1-[place] diet, pain controlled with oral meds. Exam - incision c/d/i, calf - soft, nvi. Then had fall in the hospital with hyperflexion of the knee. Lowered to ground by staff. Post event radiographs demonstrated change in implant positioning with marked proximal migration of the patella. Knee immobilizer applied. Able to weight-bear. POD#2 continued to work with PT/OT, weightbearing with immobilizer. D/c on POD#3. Consults: PT, OT, internal medicine, social work    Antibiotic Prophylaxis:  24 hrs perioperative Ancef     DVT Prophylaxis:  ASA 81 mg BID x 30 days    Weight Bearing Status:  WBAT with assistive device and knee immobilizer    Disposition: home    Patient Instructions:      Medication List        START taking these medications      metoprolol succinate 25 MG extended release tablet  Commonly known as: TOPROL XL  Take 1 tablet by mouth daily            CHANGE how you take these medications      aspirin 81 MG EC tablet  Take 1 tablet by mouth 2 times daily For DVT prophylaxis. After 30 days, resume normal home dose.   What changed:   when to take this  additional instructions            CONTINUE taking these medications      levothyroxine 75 MCG tablet  Commonly known as: SYNTHROID     meloxicam 15 MG tablet  Commonly known as: MOBIC     pravastatin 80 MG tablet  Commonly known as: PRAVACHOL     vitamin C 500 MG tablet  Commonly known as: ASCORBIC ACID     Vitamin D3 125 MCG (5000 UT) Tabs     zinc gluconate 50 MG tablet            STOP taking these medications      losartan 100 MG tablet  Commonly known as: COZAAR            ASK your doctor about these medications      cloNIDine 0.1 MG tablet  Commonly known as: CATAPRES     oxyCODONE 5 MG immediate release tablet  Commonly known as: ROXICODONE  Take 1 tablet by mouth every 4 hours as needed for Pain for up to 7 days. Ask about: Should I take this medication? Where to Get Your Medications        These medications were sent to [address] [place] [address] [phone]  [facility] [address] [facility] [address]      Phone: [phone]   metoprolol succinate 25 MG extended release tablet       You can get these medications from any pharmacy    Bring a paper prescription for each of these medications  aspirin 81 MG EC tablet  oxyCODONE 5 MG immediate release tablet        Activity: activity as tolerated as long as wearing knee immobilizer, no driving while on analgesics, and no heavy lifting for 4 weeks  Diet: regular diet  Wound Care: Aquacel dressing, ok to shower if intact. Remove POD#7 and leave open to air if no drainage.       Follow-up in the office at 2 weeks post-op [phone]    Signed:  Tanya Kay PA-C  11/30/2022  12:43 PM

## 2022-12-01 ENCOUNTER — ANESTHESIA (OUTPATIENT)
Dept: OPERATING ROOM | Age: 72
End: 2022-12-01
Payer: MEDICARE

## 2022-12-01 ENCOUNTER — HOSPITAL ENCOUNTER (OUTPATIENT)
Age: 72
Setting detail: OBSERVATION
Discharge: HOME | End: 2022-12-02
Attending: ORTHOPAEDIC SURGERY | Admitting: ORTHOPAEDIC SURGERY
Payer: MEDICARE

## 2022-12-01 DIAGNOSIS — G89.18 POST-OPERATIVE PAIN: Primary | ICD-10-CM

## 2022-12-01 PROBLEM — S86.812A PATELLAR TENDON STRAIN, LEFT, INITIAL ENCOUNTER: Status: ACTIVE | Noted: 2022-12-01

## 2022-12-01 PROCEDURE — 7100000001 HC PACU RECOVERY - ADDTL 15 MIN: Performed by: ORTHOPAEDIC SURGERY

## 2022-12-01 PROCEDURE — 6360000002 HC RX W HCPCS: Performed by: ORTHOPAEDIC SURGERY

## 2022-12-01 PROCEDURE — 6360000002 HC RX W HCPCS: Performed by: NURSE ANESTHETIST, CERTIFIED REGISTERED

## 2022-12-01 PROCEDURE — 2709999900 HC NON-CHARGEABLE SUPPLY: Performed by: ORTHOPAEDIC SURGERY

## 2022-12-01 PROCEDURE — 6360000002 HC RX W HCPCS

## 2022-12-01 PROCEDURE — 2500000003 HC RX 250 WO HCPCS: Performed by: NURSE ANESTHETIST, CERTIFIED REGISTERED

## 2022-12-01 PROCEDURE — 6370000000 HC RX 637 (ALT 250 FOR IP): Performed by: ORTHOPAEDIC SURGERY

## 2022-12-01 PROCEDURE — 6360000002 HC RX W HCPCS: Performed by: PHYSICIAN ASSISTANT

## 2022-12-01 PROCEDURE — 3600000013 HC SURGERY LEVEL 3 ADDTL 15MIN: Performed by: ORTHOPAEDIC SURGERY

## 2022-12-01 PROCEDURE — 2580000003 HC RX 258: Performed by: PHYSICIAN ASSISTANT

## 2022-12-01 PROCEDURE — 2580000003 HC RX 258: Performed by: NURSE ANESTHETIST, CERTIFIED REGISTERED

## 2022-12-01 PROCEDURE — 2500000003 HC RX 250 WO HCPCS: Performed by: PHYSICIAN ASSISTANT

## 2022-12-01 PROCEDURE — 3700000000 HC ANESTHESIA ATTENDED CARE: Performed by: ORTHOPAEDIC SURGERY

## 2022-12-01 PROCEDURE — 2580000003 HC RX 258: Performed by: ORTHOPAEDIC SURGERY

## 2022-12-01 PROCEDURE — 3700000001 HC ADD 15 MINUTES (ANESTHESIA): Performed by: ORTHOPAEDIC SURGERY

## 2022-12-01 PROCEDURE — C1713 ANCHOR/SCREW BN/BN,TIS/BN: HCPCS | Performed by: ORTHOPAEDIC SURGERY

## 2022-12-01 PROCEDURE — 3600000003 HC SURGERY LEVEL 3 BASE: Performed by: ORTHOPAEDIC SURGERY

## 2022-12-01 PROCEDURE — 6370000000 HC RX 637 (ALT 250 FOR IP): Performed by: PHYSICIAN ASSISTANT

## 2022-12-01 PROCEDURE — 64447 NJX AA&/STRD FEMORAL NRV IMG: CPT | Performed by: ANESTHESIOLOGY

## 2022-12-01 PROCEDURE — 7100000000 HC PACU RECOVERY - FIRST 15 MIN: Performed by: ORTHOPAEDIC SURGERY

## 2022-12-01 PROCEDURE — G0378 HOSPITAL OBSERVATION PER HR: HCPCS

## 2022-12-01 PROCEDURE — 6360000002 HC RX W HCPCS: Performed by: ANESTHESIOLOGY

## 2022-12-01 DEVICE — SPKD LGMNT STAPLE,8 MMX 20 MMMED
Type: IMPLANTABLE DEVICE | Site: KNEE | Status: FUNCTIONAL
Brand: ARTHREX®

## 2022-12-01 RX ORDER — MIDAZOLAM HYDROCHLORIDE 2 MG/2ML
1 INJECTION, SOLUTION INTRAMUSCULAR; INTRAVENOUS EVERY 5 MIN PRN
Status: DISCONTINUED | OUTPATIENT
Start: 2022-12-01 | End: 2022-12-01 | Stop reason: HOSPADM

## 2022-12-01 RX ORDER — DEXAMETHASONE SODIUM PHOSPHATE 4 MG/ML
INJECTION, SOLUTION INTRA-ARTICULAR; INTRALESIONAL; INTRAMUSCULAR; INTRAVENOUS; SOFT TISSUE PRN
Status: DISCONTINUED | OUTPATIENT
Start: 2022-12-01 | End: 2022-12-01 | Stop reason: SDUPTHER

## 2022-12-01 RX ORDER — PROPOFOL 10 MG/ML
INJECTION, EMULSION INTRAVENOUS CONTINUOUS PRN
Status: DISCONTINUED | OUTPATIENT
Start: 2022-12-01 | End: 2022-12-01 | Stop reason: SDUPTHER

## 2022-12-01 RX ORDER — GLYCOPYRROLATE 0.2 MG/ML
INJECTION INTRAMUSCULAR; INTRAVENOUS PRN
Status: DISCONTINUED | OUTPATIENT
Start: 2022-12-01 | End: 2022-12-01 | Stop reason: SDUPTHER

## 2022-12-01 RX ORDER — SODIUM CHLORIDE, SODIUM LACTATE, POTASSIUM CHLORIDE, CALCIUM CHLORIDE 600; 310; 30; 20 MG/100ML; MG/100ML; MG/100ML; MG/100ML
INJECTION, SOLUTION INTRAVENOUS CONTINUOUS
Status: DISCONTINUED | OUTPATIENT
Start: 2022-12-01 | End: 2022-12-02 | Stop reason: HOSPADM

## 2022-12-01 RX ORDER — ROPIVACAINE HYDROCHLORIDE 5 MG/ML
INJECTION, SOLUTION EPIDURAL; INFILTRATION; PERINEURAL
Status: COMPLETED
Start: 2022-12-01 | End: 2022-12-01

## 2022-12-01 RX ORDER — ZINC SULFATE 50(220)MG
50 CAPSULE ORAL DAILY
Status: DISCONTINUED | OUTPATIENT
Start: 2022-12-01 | End: 2022-12-02 | Stop reason: HOSPADM

## 2022-12-01 RX ORDER — FENTANYL CITRATE 50 UG/ML
100 INJECTION, SOLUTION INTRAMUSCULAR; INTRAVENOUS ONCE
Status: COMPLETED | OUTPATIENT
Start: 2022-12-01 | End: 2022-12-01

## 2022-12-01 RX ORDER — DIPHENHYDRAMINE HYDROCHLORIDE 50 MG/ML
25 INJECTION INTRAMUSCULAR; INTRAVENOUS EVERY 6 HOURS PRN
Status: DISCONTINUED | OUTPATIENT
Start: 2022-12-01 | End: 2022-12-02 | Stop reason: HOSPADM

## 2022-12-01 RX ORDER — FAMOTIDINE 20 MG/1
20 TABLET, FILM COATED ORAL 2 TIMES DAILY
Status: DISCONTINUED | OUTPATIENT
Start: 2022-12-01 | End: 2022-12-02 | Stop reason: HOSPADM

## 2022-12-01 RX ORDER — DIPHENHYDRAMINE HCL 25 MG
25 TABLET ORAL EVERY 6 HOURS PRN
Status: DISCONTINUED | OUTPATIENT
Start: 2022-12-01 | End: 2022-12-02 | Stop reason: HOSPADM

## 2022-12-01 RX ORDER — SODIUM CHLORIDE 9 MG/ML
INJECTION, SOLUTION INTRAVENOUS PRN
Status: DISCONTINUED | OUTPATIENT
Start: 2022-12-01 | End: 2022-12-02 | Stop reason: HOSPADM

## 2022-12-01 RX ORDER — BUPIVACAINE HYDROCHLORIDE 7.5 MG/ML
INJECTION, SOLUTION INTRASPINAL PRN
Status: DISCONTINUED | OUTPATIENT
Start: 2022-12-01 | End: 2022-12-01 | Stop reason: SDUPTHER

## 2022-12-01 RX ORDER — KETAMINE HYDROCHLORIDE 10 MG/ML
INJECTION, SOLUTION INTRAMUSCULAR; INTRAVENOUS PRN
Status: DISCONTINUED | OUTPATIENT
Start: 2022-12-01 | End: 2022-12-01 | Stop reason: SDUPTHER

## 2022-12-01 RX ORDER — ONDANSETRON 2 MG/ML
4 INJECTION INTRAMUSCULAR; INTRAVENOUS EVERY 6 HOURS PRN
Status: DISCONTINUED | OUTPATIENT
Start: 2022-12-01 | End: 2022-12-02 | Stop reason: HOSPADM

## 2022-12-01 RX ORDER — MIDAZOLAM HYDROCHLORIDE 1 MG/ML
INJECTION INTRAMUSCULAR; INTRAVENOUS
Status: COMPLETED
Start: 2022-12-01 | End: 2022-12-01

## 2022-12-01 RX ORDER — ROPIVACAINE HYDROCHLORIDE 5 MG/ML
INJECTION, SOLUTION EPIDURAL; INFILTRATION; PERINEURAL
Status: COMPLETED | OUTPATIENT
Start: 2022-12-01 | End: 2022-12-01

## 2022-12-01 RX ORDER — LEVOTHYROXINE SODIUM 0.07 MG/1
75 TABLET ORAL DAILY
Status: DISCONTINUED | OUTPATIENT
Start: 2022-12-02 | End: 2022-12-02 | Stop reason: HOSPADM

## 2022-12-01 RX ORDER — SODIUM CHLORIDE 0.9 % (FLUSH) 0.9 %
5-40 SYRINGE (ML) INJECTION PRN
Status: DISCONTINUED | OUTPATIENT
Start: 2022-12-01 | End: 2022-12-01 | Stop reason: HOSPADM

## 2022-12-01 RX ORDER — DEXAMETHASONE SODIUM PHOSPHATE 10 MG/ML
8 INJECTION, SOLUTION INTRAMUSCULAR; INTRAVENOUS ONCE
Status: DISCONTINUED | OUTPATIENT
Start: 2022-12-01 | End: 2022-12-01 | Stop reason: HOSPADM

## 2022-12-01 RX ORDER — SODIUM CHLORIDE 0.9 % (FLUSH) 0.9 %
5-40 SYRINGE (ML) INJECTION PRN
Status: DISCONTINUED | OUTPATIENT
Start: 2022-12-01 | End: 2022-12-02 | Stop reason: HOSPADM

## 2022-12-01 RX ORDER — SODIUM CHLORIDE 9 MG/ML
INJECTION, SOLUTION INTRAVENOUS PRN
Status: DISCONTINUED | OUTPATIENT
Start: 2022-12-01 | End: 2022-12-01 | Stop reason: HOSPADM

## 2022-12-01 RX ORDER — SODIUM CHLORIDE 0.9 % (FLUSH) 0.9 %
5-40 SYRINGE (ML) INJECTION EVERY 12 HOURS SCHEDULED
Status: DISCONTINUED | OUTPATIENT
Start: 2022-12-01 | End: 2022-12-01 | Stop reason: HOSPADM

## 2022-12-01 RX ORDER — FENTANYL CITRATE 50 UG/ML
INJECTION, SOLUTION INTRAMUSCULAR; INTRAVENOUS
Status: COMPLETED
Start: 2022-12-01 | End: 2022-12-01

## 2022-12-01 RX ORDER — ROPIVACAINE HYDROCHLORIDE 5 MG/ML
30 INJECTION, SOLUTION EPIDURAL; INFILTRATION; PERINEURAL
Status: DISCONTINUED | OUTPATIENT
Start: 2022-12-01 | End: 2022-12-01 | Stop reason: HOSPADM

## 2022-12-01 RX ORDER — ACETAMINOPHEN 500 MG
1000 TABLET ORAL ONCE
Status: COMPLETED | OUTPATIENT
Start: 2022-12-01 | End: 2022-12-01

## 2022-12-01 RX ORDER — VANCOMYCIN HYDROCHLORIDE 1 G/20ML
INJECTION, POWDER, LYOPHILIZED, FOR SOLUTION INTRAVENOUS PRN
Status: DISCONTINUED | OUTPATIENT
Start: 2022-12-01 | End: 2022-12-01 | Stop reason: ALTCHOICE

## 2022-12-01 RX ORDER — OXYCODONE HYDROCHLORIDE 5 MG/1
10 TABLET ORAL EVERY 4 HOURS PRN
Status: DISCONTINUED | OUTPATIENT
Start: 2022-12-01 | End: 2022-12-02 | Stop reason: HOSPADM

## 2022-12-01 RX ORDER — VITAMIN B COMPLEX
5000 TABLET ORAL DAILY
Status: DISCONTINUED | OUTPATIENT
Start: 2022-12-01 | End: 2022-12-02 | Stop reason: HOSPADM

## 2022-12-01 RX ORDER — OXYCODONE HYDROCHLORIDE 5 MG/1
5 TABLET ORAL EVERY 4 HOURS PRN
Status: DISCONTINUED | OUTPATIENT
Start: 2022-12-01 | End: 2022-12-02 | Stop reason: HOSPADM

## 2022-12-01 RX ORDER — PRAVASTATIN SODIUM 20 MG
80 TABLET ORAL DAILY
Status: DISCONTINUED | OUTPATIENT
Start: 2022-12-01 | End: 2022-12-02 | Stop reason: HOSPADM

## 2022-12-01 RX ORDER — SODIUM CHLORIDE, SODIUM LACTATE, POTASSIUM CHLORIDE, CALCIUM CHLORIDE 600; 310; 30; 20 MG/100ML; MG/100ML; MG/100ML; MG/100ML
INJECTION, SOLUTION INTRAVENOUS CONTINUOUS
Status: DISCONTINUED | OUTPATIENT
Start: 2022-12-01 | End: 2022-12-01

## 2022-12-01 RX ORDER — SENNA AND DOCUSATE SODIUM 50; 8.6 MG/1; MG/1
1 TABLET, FILM COATED ORAL 2 TIMES DAILY
Status: DISCONTINUED | OUTPATIENT
Start: 2022-12-01 | End: 2022-12-02 | Stop reason: HOSPADM

## 2022-12-01 RX ORDER — ASPIRIN 81 MG/1
81 TABLET ORAL DAILY
Status: DISCONTINUED | OUTPATIENT
Start: 2022-12-01 | End: 2022-12-02 | Stop reason: HOSPADM

## 2022-12-01 RX ORDER — ASCORBIC ACID 500 MG
1000 TABLET ORAL DAILY
Status: DISCONTINUED | OUTPATIENT
Start: 2022-12-01 | End: 2022-12-02 | Stop reason: HOSPADM

## 2022-12-01 RX ORDER — FENTANYL CITRATE 50 UG/ML
INJECTION, SOLUTION INTRAMUSCULAR; INTRAVENOUS PRN
Status: DISCONTINUED | OUTPATIENT
Start: 2022-12-01 | End: 2022-12-01 | Stop reason: SDUPTHER

## 2022-12-01 RX ORDER — SODIUM CHLORIDE 0.9 % (FLUSH) 0.9 %
5-40 SYRINGE (ML) INJECTION EVERY 12 HOURS SCHEDULED
Status: DISCONTINUED | OUTPATIENT
Start: 2022-12-01 | End: 2022-12-02 | Stop reason: HOSPADM

## 2022-12-01 RX ORDER — ONDANSETRON 4 MG/1
4 TABLET, ORALLY DISINTEGRATING ORAL EVERY 8 HOURS PRN
Status: DISCONTINUED | OUTPATIENT
Start: 2022-12-01 | End: 2022-12-02 | Stop reason: HOSPADM

## 2022-12-01 RX ORDER — ONDANSETRON 2 MG/ML
INJECTION INTRAMUSCULAR; INTRAVENOUS PRN
Status: DISCONTINUED | OUTPATIENT
Start: 2022-12-01 | End: 2022-12-01 | Stop reason: SDUPTHER

## 2022-12-01 RX ORDER — SODIUM CHLORIDE 9 MG/ML
INJECTION, SOLUTION INTRAVENOUS CONTINUOUS PRN
Status: DISCONTINUED | OUTPATIENT
Start: 2022-12-01 | End: 2022-12-01 | Stop reason: SDUPTHER

## 2022-12-01 RX ORDER — METOPROLOL SUCCINATE 25 MG/1
25 TABLET, EXTENDED RELEASE ORAL DAILY
Status: DISCONTINUED | OUTPATIENT
Start: 2022-12-02 | End: 2022-12-02 | Stop reason: HOSPADM

## 2022-12-01 RX ADMIN — ONDANSETRON 4 MG: 2 INJECTION INTRAMUSCULAR; INTRAVENOUS at 17:25

## 2022-12-01 RX ADMIN — KETAMINE HYDROCHLORIDE 10 MG: 10 INJECTION INTRAMUSCULAR; INTRAVENOUS at 17:14

## 2022-12-01 RX ADMIN — FENTANYL CITRATE 50 MCG: 50 INJECTION INTRAMUSCULAR; INTRAVENOUS at 14:57

## 2022-12-01 RX ADMIN — PROPOFOL 30 MG: 10 INJECTION, EMULSION INTRAVENOUS at 18:16

## 2022-12-01 RX ADMIN — SODIUM CHLORIDE, POTASSIUM CHLORIDE, SODIUM LACTATE AND CALCIUM CHLORIDE: 600; 310; 30; 20 INJECTION, SOLUTION INTRAVENOUS at 21:51

## 2022-12-01 RX ADMIN — BUPIVACAINE HYDROCHLORIDE IN DEXTROSE 1.8 ML: 7.5 INJECTION, SOLUTION SUBARACHNOID at 16:05

## 2022-12-01 RX ADMIN — WATER 2000 MG: 1 INJECTION INTRAMUSCULAR; INTRAVENOUS; SUBCUTANEOUS at 16:00

## 2022-12-01 RX ADMIN — BUPIVACAINE HYDROCHLORIDE 13.5 MG: 7.5 INJECTION, SOLUTION INTRASPINAL at 16:00

## 2022-12-01 RX ADMIN — MIDAZOLAM HYDROCHLORIDE 1 MG: 2 INJECTION, SOLUTION INTRAMUSCULAR; INTRAVENOUS at 14:57

## 2022-12-01 RX ADMIN — DOCUSATE SODIUM 50 MG AND SENNOSIDES 8.6 MG 1 TABLET: 8.6; 5 TABLET, FILM COATED ORAL at 21:44

## 2022-12-01 RX ADMIN — TRANEXAMIC ACID 1000 MG: 1 INJECTION, SOLUTION INTRAVENOUS at 16:13

## 2022-12-01 RX ADMIN — ZINC SULFATE 220 MG (50 MG) CAPSULE 50 MG: CAPSULE at 21:50

## 2022-12-01 RX ADMIN — DEXAMETHASONE SODIUM PHOSPHATE 8 MG: 4 INJECTION, SOLUTION INTRAMUSCULAR; INTRAVENOUS at 16:08

## 2022-12-01 RX ADMIN — WATER 2000 MG: 1 INJECTION INTRAMUSCULAR; INTRAVENOUS; SUBCUTANEOUS at 23:37

## 2022-12-01 RX ADMIN — ACETAMINOPHEN 1000 MG: 500 TABLET ORAL at 09:24

## 2022-12-01 RX ADMIN — KETAMINE HYDROCHLORIDE 20 MG: 10 INJECTION INTRAMUSCULAR; INTRAVENOUS at 16:10

## 2022-12-01 RX ADMIN — FAMOTIDINE 20 MG: 20 TABLET ORAL at 21:44

## 2022-12-01 RX ADMIN — GLYCOPYRROLATE 0.2 MG: 0.2 INJECTION, SOLUTION INTRAMUSCULAR; INTRAVENOUS at 15:58

## 2022-12-01 RX ADMIN — PROPOFOL 20 MG: 10 INJECTION, EMULSION INTRAVENOUS at 18:22

## 2022-12-01 RX ADMIN — PROPOFOL 50 MCG/KG/MIN: 10 INJECTION, EMULSION INTRAVENOUS at 16:06

## 2022-12-01 RX ADMIN — Medication 5000 UNITS: at 21:43

## 2022-12-01 RX ADMIN — ASPIRIN 81 MG: 81 TABLET, COATED ORAL at 21:44

## 2022-12-01 RX ADMIN — PRAVASTATIN SODIUM 80 MG: 20 TABLET ORAL at 21:44

## 2022-12-01 RX ADMIN — KETAMINE HYDROCHLORIDE 10 MG: 10 INJECTION INTRAMUSCULAR; INTRAVENOUS at 18:10

## 2022-12-01 RX ADMIN — FENTANYL CITRATE 50 MCG: 50 INJECTION, SOLUTION INTRAMUSCULAR; INTRAVENOUS at 15:59

## 2022-12-01 RX ADMIN — ROPIVACAINE HYDROCHLORIDE 20 ML: 5 INJECTION, SOLUTION EPIDURAL; INFILTRATION; PERINEURAL at 14:55

## 2022-12-01 RX ADMIN — OXYCODONE 10 MG: 5 TABLET ORAL at 21:46

## 2022-12-01 RX ADMIN — MIDAZOLAM 1 MG: 1 INJECTION INTRAMUSCULAR; INTRAVENOUS at 14:57

## 2022-12-01 RX ADMIN — SODIUM CHLORIDE: 9 INJECTION, SOLUTION INTRAVENOUS at 15:58

## 2022-12-01 RX ADMIN — FENTANYL CITRATE 50 MCG: 50 INJECTION, SOLUTION INTRAMUSCULAR; INTRAVENOUS at 14:57

## 2022-12-01 RX ADMIN — OXYCODONE HYDROCHLORIDE AND ACETAMINOPHEN 1000 MG: 500 TABLET ORAL at 21:44

## 2022-12-01 RX ADMIN — SODIUM CHLORIDE: 9 INJECTION, SOLUTION INTRAVENOUS at 16:38

## 2022-12-01 ASSESSMENT — PAIN DESCRIPTION - LOCATION: LOCATION: KNEE

## 2022-12-01 ASSESSMENT — PAIN SCALES - GENERAL
PAINLEVEL_OUTOF10: 5
PAINLEVEL_OUTOF10: 3

## 2022-12-01 ASSESSMENT — PAIN - FUNCTIONAL ASSESSMENT: PAIN_FUNCTIONAL_ASSESSMENT: 0-10

## 2022-12-01 ASSESSMENT — PAIN DESCRIPTION - ORIENTATION: ORIENTATION: LEFT

## 2022-12-01 ASSESSMENT — PAIN DESCRIPTION - DESCRIPTORS: DESCRIPTORS: JABBING;SORE

## 2022-12-01 NOTE — BRIEF OP NOTE
Brief Postoperative Note      Patient: Rosalina Carroll  YOB: 1950  MRN: [id number]    Date of Procedure: 12/1/2022    Pre-Op Diagnosis: Extensor mechanism rupture left knee status post total knee arthroplasty    Post-Op Diagnosis: Same       Procedure(s):  LEFT KNEE AUTOGRAFT RECONSTRUCTION EXTENSOR MECHANISM INDICATED PROCEDURES +PNB++; application of cylinder cast.    Surgeon(s):  MD Mary Sadler MD    Assistant:  Physician Assistant: Hina Angela PA-C    Anesthesia: Spinal    Estimated Blood Loss (mL): less than 741     Complications: None    Specimens:   * No specimens in log *    Implants:  Implant Name Type Inv. Item Serial No.  Lot No. LRB No. Used Action   STAPLE BNE FIX [id number] CO CHROM SPIK LIG LO PROF W/ ATTCH - [id number]  STAPLE BNE FIX [id number] CO CHROM SPIK LIG LO PROF W/ ATTCH  [employer] [id number] Left 1 Implanted         Drains:   [REMOVED] Closed/Suction Drain Left; Anterior Knee Accordion (Removed)   Site Description Clean, dry & intact 11/03/22 2330   Dressing Status Clean, dry & intact 11/03/22 2330   Drainage Appearance Bloody 11/03/22 2330   Output (ml) 140 ml 11/1950       Findings: complete midsubstance rupture left patellar tendon and medial + lateral retinaculum.     Electronically signed by Terry Hawley MD on 12/1/2022 at 6:42 PM

## 2022-12-01 NOTE — ANESTHESIA PRE PROCEDURE
Department of Anesthesiology  Preprocedure Note       Name:  Otilia Amos Masters   Age:  67 y.o.  :  1950                                          MRN:  [id number]         Date:  2022      Surgeon: Mary Henderson):  Vonda Simmons MD    Procedure: Procedure(s):  LEFT KNEE AUTOGRAFT RECONSTRUCTION EXTENSOR MECHANISM INDICATED PROCEDURES +PNB++    Medications prior to admission:   Prior to Admission medications    Medication Sig Start Date End Date Taking? Authorizing Provider   metoprolol succinate (TOPROL XL) 25 MG extended release tablet Take 1 tablet by mouth daily 22   Chichi Lawson MD   aspirin 81 MG EC tablet Take 1 tablet by mouth 2 times daily For DVT prophylaxis. After 30 days, resume normal home dose. Patient taking differently: Take 81 mg by mouth 2 times daily 22  Samina Mejía PA-C   levothyroxine (SYNTHROID) 75 MCG tablet Take 75 mcg by mouth Daily 22   Historical Provider, MD   pravastatin (PRAVACHOL) 80 MG tablet Take 80 mg by mouth daily 3/23/18   Historical Provider, MD   meloxicam (MOBIC) 15 MG tablet Take 15 mg by mouth daily 22   Historical Provider, MD   vitamin C (ASCORBIC ACID) 500 MG tablet Take 1,000 mg by mouth daily    Historical Provider, MD   Cholecalciferol (VITAMIN D3) 125 MCG (5000 UT) TABS Take 1 tablet by mouth daily    Historical Provider, MD   zinc gluconate 50 MG tablet Take 50 mg by mouth daily    Historical Provider, MD       Current medications:    No current facility-administered medications for this visit. No current outpatient medications on file.      Facility-Administered Medications Ordered in Other Visits   Medication Dose Route Frequency Provider Last Rate Last Admin    dexamethasone (PF) (DECADRON) injection 8 mg  8 mg IntraVENous Once Samina Mejía PA-C        lactated ringers infusion   IntraVENous Continuous Samina Mejía PA-C        sodium chloride flush 0.9 % injection 5-40 mL  5-40 mL IntraVENous 2 times per day Cecy Diallo PA-C        sodium chloride flush 0.9 % injection 5-40 mL  5-40 mL IntraVENous PRN Cecy Diallo PA-C        0.9 % sodium chloride infusion   IntraVENous PRN Cecy Diallo PA-C        ceFAZolin (ANCEF) 2,000 mg in sterile water 20 mL IV syringe  2,000 mg IntraVENous On Call to [address], PA-C        tranexamic acid (CYKLOKAPRON) 1,000 mg in sodium chloride 0.9 % 100 mL IVPB  1,000 mg IntraVENous On Call to [address], PA-C           Allergies:  No Known Allergies    Problem List:    Patient Active Problem List   Diagnosis Code    Osteoarthritis of left knee, unspecified osteoarthritis type M17.12    SVT (supraventricular tachycardia) (HCC) I47.1    Post-operative pain G89.18    Patellar tendon strain, left, initial encounter N32.705A       Past Medical History:        Diagnosis Date    Arthritis     Hyperlipidemia     Hypertension     Left knee pain     PONV (postoperative nausea and vomiting)     Thyroid disease        Past Surgical History:        Procedure Laterality Date    BREAST SURGERY Left     cysts removed, hyplasia    COLONOSCOPY  2012    HYSTERECTOMY (CERVIX STATUS UNKNOWN)  1980    JOINT REPLACEMENT Left 2018    hip    TOTAL KNEE ARTHROPLASTY Left 11/3/2022    LEFT KNEE TOTAL ARTHROPLASTY performed by Amanda Thomas MD at [facility] OR       Social History:    Social History     Tobacco Use    Smoking status: Never    Smokeless tobacco: Never   Substance Use Topics    Alcohol use: Not Currently                                Counseling given: Not Answered      Vital Signs (Current): There were no vitals filed for this visit.                                            BP Readings from Last 3 Encounters:   12/01/22 (!) 189/93   11/06/22 (!) 123/57   10/31/22 (!) 194/81       NPO Status:                                                                                 BMI:   Wt Readings from Last 3 Encounters:   12/01/22 243 lb (110.2 kg)   11/06/22 254 lb (115.2 kg) 10/31/22 243 lb (110.2 kg)     There is no height or weight on file to calculate BMI.    CBC:   Lab Results   Component Value Date/Time    WBC 9.4 11/06/2022 06:48 AM    RBC 3.55 11/06/2022 06:48 AM    HGB 9.7 11/06/2022 06:48 AM    HCT 31.1 11/06/2022 06:48 AM    MCV 87.6 11/06/2022 06:48 AM    RDW 15.9 11/06/2022 06:48 AM     11/06/2022 06:48 AM       CMP:   Lab Results   Component Value Date/Time     11/06/2022 06:48 AM    K 4.1 11/06/2022 06:48 AM     11/06/2022 06:48 AM    CO2 26 11/06/2022 06:48 AM    BUN 16 11/06/2022 06:48 AM    CREATININE 0.7 11/06/2022 06:48 AM    LABGLOM >60 11/06/2022 06:48 AM    GLUCOSE 106 11/06/2022 06:48 AM    PROT 6.2 11/04/2022 08:00 PM    CALCIUM 9.3 11/06/2022 06:48 AM    BILITOT 0.2 11/04/2022 08:00 PM    ALKPHOS 98 11/04/2022 08:00 PM    AST 29 11/04/2022 08:00 PM    ALT 13 11/04/2022 08:00 PM       POC Tests: No results for input(s): POCGLU, POCNA, POCK, POCCL, POCBUN, POCHEMO, POCHCT in the last 72 hours. Coags: No results found for: PROTIME, INR, APTT    HCG (If Applicable): No results found for: PREGTESTUR, PREGSERUM, HCG, HCGQUANT     ABGs: No results found for: PHART, PO2ART, GQC5MTR, EDI9EZW, BEART, P9JDWXFH     Type & Screen (If Applicable):  No results found for: LABABO, LABRH    Drug/Infectious Status (If Applicable):  No results found for: HIV, HEPCAB    COVID-19 Screening (If Applicable): No results found for: COVID19        Anesthesia Evaluation  Patient summary reviewed and Nursing notes reviewed   history of anesthetic complications: PONV.   Airway: Mallampati: III  TM distance: >3 FB   Neck ROM: full  Mouth opening: > = 3 FB   Dental:      Comment: None loose    Pulmonary:Negative Pulmonary ROS breath sounds clear to auscultation                             Cardiovascular:    (+) hypertension:,       ECG reviewed  Rhythm: regular  Rate: normal           Beta Blocker:  Not on Beta Blocker         Neuro/Psych:   (+) depression/anxiety

## 2022-12-01 NOTE — ANESTHESIA PROCEDURE NOTES
Peripheral Block    Patient location during procedure: PACU  Reason for block: post-op pain management  Start time: 12/1/2022 2:55 PM  End time: 12/1/2022 3:02 PM  Staffing  Performed: anesthesiologist   Anesthesiologist: Brien Hull MD  Preanesthetic Checklist  Completed: patient identified, IV checked, site marked, risks and benefits discussed, surgical/procedural consents, equipment checked, pre-op evaluation, timeout performed, anesthesia consent given, oxygen available and monitors applied/VS acknowledged  Peripheral Block   Patient position: supine  Prep: ChloraPrep  Provider prep: mask and sterile gloves  Patient monitoring: cardiac monitor, frequent blood pressure checks and IV access  Block type: Femoral  Adductor canal  Laterality: left  Injection technique: single-shot  Guidance: ultrasound guided  Local infiltration: bupivacaine  Infiltration strength: 0.5 %  Local infiltration: bupivacaine  Dose: 30 mL    Needle   Needle type: combined needle/nerve stimulator   Needle gauge: 22 G  Needle localization: ultrasound guidance  Needle length: 10 cm  Assessment   Injection assessment: negative aspiration for heme, no paresthesia on injection and local visualized surrounding nerve on ultrasound  Paresthesia pain: none  Slow fractionated injection: yes  Hemodynamics: stable  Real-time US image taken/store: yes  Outcomes: uncomplicated and patient tolerated procedure well    Medications Administered  ropivacaine (NAROPIN) injection 0.5% - Perineural, Thigh Left   20 mL - 12/1/2022 2:55:00 PM

## 2022-12-02 VITALS
BODY MASS INDEX: 43.05 KG/M2 | HEART RATE: 98 BPM | DIASTOLIC BLOOD PRESSURE: 72 MMHG | SYSTOLIC BLOOD PRESSURE: 137 MMHG | OXYGEN SATURATION: 96 % | HEIGHT: 63 IN | WEIGHT: 243 LBS | TEMPERATURE: 98.6 F | RESPIRATION RATE: 16 BRPM

## 2022-12-02 LAB
ANION GAP SERPL CALCULATED.3IONS-SCNC: 10 MMOL/L (ref 7–16)
BUN BLDV-MCNC: 15 MG/DL (ref 6–23)
CALCIUM SERPL-MCNC: 9 MG/DL (ref 8.6–10.2)
CHLORIDE BLD-SCNC: 105 MMOL/L (ref 98–107)
CO2: 25 MMOL/L (ref 22–29)
CREAT SERPL-MCNC: 0.7 MG/DL (ref 0.5–1)
GFR SERPL CREATININE-BSD FRML MDRD: >60 ML/MIN/1.73
GLUCOSE BLD-MCNC: 137 MG/DL (ref 74–99)
HCT VFR BLD CALC: 31.9 % (ref 34–48)
HEMOGLOBIN: 9.7 G/DL (ref 11.5–15.5)
MCH RBC QN AUTO: 26.4 PG (ref 26–35)
MCHC RBC AUTO-ENTMCNC: 30.4 % (ref 32–34.5)
MCV RBC AUTO: 86.9 FL (ref 80–99.9)
PDW BLD-RTO: 16.3 FL (ref 11.5–15)
PLATELET # BLD: 292 E9/L (ref 130–450)
PMV BLD AUTO: 9.9 FL (ref 7–12)
POTASSIUM SERPL-SCNC: 4.3 MMOL/L (ref 3.5–5)
RBC # BLD: 3.67 E12/L (ref 3.5–5.5)
SODIUM BLD-SCNC: 140 MMOL/L (ref 132–146)
WBC # BLD: 10.7 E9/L (ref 4.5–11.5)

## 2022-12-02 PROCEDURE — 36415 COLL VENOUS BLD VENIPUNCTURE: CPT

## 2022-12-02 PROCEDURE — 6370000000 HC RX 637 (ALT 250 FOR IP): Performed by: ORTHOPAEDIC SURGERY

## 2022-12-02 PROCEDURE — 97530 THERAPEUTIC ACTIVITIES: CPT

## 2022-12-02 PROCEDURE — 97161 PT EVAL LOW COMPLEX 20 MIN: CPT

## 2022-12-02 PROCEDURE — 2580000003 HC RX 258: Performed by: ORTHOPAEDIC SURGERY

## 2022-12-02 PROCEDURE — 80048 BASIC METABOLIC PNL TOTAL CA: CPT

## 2022-12-02 PROCEDURE — 85027 COMPLETE CBC AUTOMATED: CPT

## 2022-12-02 PROCEDURE — 6360000002 HC RX W HCPCS: Performed by: ORTHOPAEDIC SURGERY

## 2022-12-02 PROCEDURE — G0378 HOSPITAL OBSERVATION PER HR: HCPCS

## 2022-12-02 PROCEDURE — 97165 OT EVAL LOW COMPLEX 30 MIN: CPT

## 2022-12-02 RX ORDER — SENNA AND DOCUSATE SODIUM 50; 8.6 MG/1; MG/1
1 TABLET, FILM COATED ORAL 2 TIMES DAILY
Qty: 60 TABLET | Refills: 0 | Status: SHIPPED | OUTPATIENT
Start: 2022-12-02

## 2022-12-02 RX ORDER — ASPIRIN 81 MG/1
81 TABLET ORAL DAILY
Qty: 30 TABLET | Refills: 0 | Status: SHIPPED | OUTPATIENT
Start: 2022-12-02

## 2022-12-02 RX ORDER — OXYCODONE HYDROCHLORIDE 5 MG/1
5 TABLET ORAL EVERY 4 HOURS PRN
Qty: 42 TABLET | Refills: 0 | Status: SHIPPED | OUTPATIENT
Start: 2022-12-02 | End: 2022-12-09

## 2022-12-02 RX ORDER — BUPIVACAINE HYDROCHLORIDE 7.5 MG/ML
INJECTION, SOLUTION INTRASPINAL
Status: DISCONTINUED | OUTPATIENT
Start: 2022-12-01 | End: 2022-12-02 | Stop reason: SDUPTHER

## 2022-12-02 RX ADMIN — Medication 10 ML: at 08:20

## 2022-12-02 RX ADMIN — Medication 5000 UNITS: at 08:18

## 2022-12-02 RX ADMIN — WATER 2000 MG: 1 INJECTION INTRAMUSCULAR; INTRAVENOUS; SUBCUTANEOUS at 06:48

## 2022-12-02 RX ADMIN — PRAVASTATIN SODIUM 80 MG: 20 TABLET ORAL at 09:42

## 2022-12-02 RX ADMIN — LEVOTHYROXINE SODIUM 75 MCG: 75 TABLET ORAL at 06:46

## 2022-12-02 RX ADMIN — DOCUSATE SODIUM 50 MG AND SENNOSIDES 8.6 MG 1 TABLET: 8.6; 5 TABLET, FILM COATED ORAL at 08:18

## 2022-12-02 RX ADMIN — OXYCODONE 10 MG: 5 TABLET ORAL at 04:52

## 2022-12-02 RX ADMIN — METOPROLOL SUCCINATE 25 MG: 25 TABLET, EXTENDED RELEASE ORAL at 08:18

## 2022-12-02 RX ADMIN — OXYCODONE 10 MG: 5 TABLET ORAL at 09:42

## 2022-12-02 RX ADMIN — FAMOTIDINE 20 MG: 20 TABLET ORAL at 08:18

## 2022-12-02 RX ADMIN — ASPIRIN 81 MG: 81 TABLET, COATED ORAL at 08:18

## 2022-12-02 RX ADMIN — ZINC SULFATE 220 MG (50 MG) CAPSULE 50 MG: CAPSULE at 08:18

## 2022-12-02 RX ADMIN — OXYCODONE HYDROCHLORIDE AND ACETAMINOPHEN 1000 MG: 500 TABLET ORAL at 08:18

## 2022-12-02 ASSESSMENT — PAIN DESCRIPTION - ORIENTATION: ORIENTATION: LEFT

## 2022-12-02 ASSESSMENT — PAIN SCALES - GENERAL
PAINLEVEL_OUTOF10: 7
PAINLEVEL_OUTOF10: 4

## 2022-12-02 ASSESSMENT — PAIN DESCRIPTION - LOCATION: LOCATION: KNEE

## 2022-12-02 ASSESSMENT — PAIN DESCRIPTION - DESCRIPTORS: DESCRIPTORS: JABBING;SHARP

## 2022-12-02 NOTE — ANESTHESIA PROCEDURE NOTES
Spinal Block    Patient location during procedure: OR  End time: 12/1/2022 4:05 PM  Reason for block: primary anesthetic and at surgeon's request  Staffing  Anesthesiologist: Robert Viveros MD  Spinal Block  Patient position: sitting  Prep: ChloraPrep  Patient monitoring: cardiac monitor, continuous pulse ox, continuous capnometry and frequent blood pressure checks  Approach: midline  Location: L3/L4  Provider prep: mask, sterile gloves and sterile gown  Needle  Needle type:  [place]   Needle gauge: 25 G  Needle length: 3.5 in  Assessment  Sensory level: T6  Swirl obtained: Yes  CSF: clear  Attempts: 1  Hemodynamics: stable  Preanesthetic Checklist  Completed: patient identified, IV checked, site marked, risks and benefits discussed, surgical/procedural consents, equipment checked, pre-op evaluation, timeout performed, anesthesia consent given, oxygen available and monitors applied/VS acknowledged

## 2022-12-02 NOTE — PLAN OF CARE
Problem: Discharge Planning  Goal: Discharge to home or other facility with appropriate resources  Outcome: Progressing  Flowsheets  Taken 12/2/2022 0028  Discharge to home or other facility with appropriate resources: Identify barriers to discharge with patient and caregiver  Taken 12/1/2022 2146  Discharge to home or other facility with appropriate resources: Identify barriers to discharge with patient and caregiver     Problem: Pain  Goal: Verbalizes/displays adequate comfort level or baseline comfort level  Outcome: Progressing

## 2022-12-02 NOTE — CARE COORDINATION
12/2/2022  Social Work Discharge Planning:Plan is home. Pt lives in [place]. Pt is active with [facility] home care [phone] . SW faxed [facility] [address] order to them [phone]. Pt will also need a BSC. Referral was made to [facility]. Electronically signed by NEHAL Bocanegra on 12/2/2022 at 9:59 AM      12/2/2022  The Plan for Transition of Care is related to the following treatment goals: hhc and dme    The Patient  was provided with a choice of provider and agrees   with the discharge plan. [x] Yes [] No    Freedom of choice list was provided with basic dialogue that supports the patient's individualized plan of care/goals, treatment preferences and shares the quality data associated with the providers.  [x] Yes [] No

## 2022-12-02 NOTE — PROGRESS NOTES
[facility] Quality Flow/Interdisciplinary Rounds Progress Note        Quality Flow Rounds held on December 2, 2022    Disciplines Attending:  Bedside Nurse, , , and Nursing Unit Leadership    Lor Madimónica Carroll was admitted on 12/1/2022  8:39 AM    Anticipated Discharge Date:  12/3/2022    Disposition:    Los Score:  Los Scale Score: 20    Readmission Risk              Risk of Unplanned Readmission:  0           Discussed patient goal for the day, patient clinical progression, and barriers to discharge.   The following Goal(s) of the Day/Commitment(s) have been identified:   PT/OT, pain control, DC planning for home tomorrow      Esdras Matute RN  December 2, 2022

## 2022-12-02 NOTE — PROGRESS NOTES
Physical Therapy  Facility/Department: [facility]  Physical Therapy Initial Assessment    Name: Patrica Weaver Masters  : 1950  MRN: [id number]  Date of Service: 2022    Attending Provider:  Da Cooper MD    Evaluating PT:  Luigi Huntley. Kaitlin Brewer P.T. Room #:  7248/1384-N  Diagnosis:  Patellar tendon strain, left, initial encounter [H50.427I]  Pertinent PMHx/PSHx:  L TKA 11/3/22, L knee gave out 22 walking with staff and knee flexed and foot under pt and she ruptured L patellar tendon,  R knee has chronic limited knee flex 15-40°  Procedure/Surgery:  L knee autograft reconstruction of extensor mechanism with cast  Precautions:  WBAT LLE    SUBJECTIVE:    Pt lives with her  in a split level home with 4+1 stairs to enter and 2 rail. Once in the home, pt has to do another 7 steps with 2 rails to get to the main living area, bed and bathroom. Pt ambulated with cane PTA. Pt states she sits in her lift chair at home.  can assist as needed. OBJECTIVE:   Initial Evaluation  Date: 22 Treatment Short Term/ Long Term   Goals   Was pt agreeable to Eval/treatment? yes     Does pt have pain? 6/10 L knee pain     Bed Mobility  Rolling: Independent  Supine to sit: NA  Sit to supine: MIN A, but she states at home was using belt from her robe to lift LLE in/out of bed  Scooting: Independent  Independent   Transfers Sit to stand: MIN A from low chair, has lift chair at home. Stand to sit: Independent  Stand pivot: Independent with ww  Independent    Ambulation   350 feet with ww Independent   350 feet with ww Independent    Stair negotiation: ascended and descended 4 steps with 2 rails supervision  8 steps with 2 rails Independent    AM-PAC 6 Clicks 32/83       BLE ROM is WFL, except L knee is casted in extension, and R knee grossly 15-40°. RLE strength is grossly 4/5 to 4+/5 in available ROM, and L hip is 3-/5 to 4/5, ankle 4/5, and L knee NA.    Balance: sitting is Independent and standing with ww is Independent  Endurance: fair+    ASSESSMENT:    Conditions Requiring Skilled Therapeutic Intervention:    [x]Decreased strength     []Decreased ROM  [x]Decreased functional mobility  []Decreased balance   [x]Decreased endurance   []Decreased posture  []Decreased sensation  []Decreased coordination   []Decreased vision  []Decreased safety awareness   [x]Increased pain       Comments:  Pt was found sitting up in chair with LLE elevated on footstool. Pt was agreeable to PT. She reported the chair was low and she has lift chair at home she sits in due to lack of knee flex BLE. She needed MIN A to stand, but was able to walk ww and perform stairs safely. Pt states her home PT after L TKA and patellar tendon rupture showed her how to safely get around at home while wearing L knee immobilizer and she was able to do so safely. Pt required assist to lift LLE into bed, but states at home was using belt from her robe to assist LLE or would grab onto her knee brace. Pt moving well and will be safe to return home at this time. Treatment:  Patient practiced and was instructed in the following treatment:    Bed mobility, transfers, gait with ww, and stairs to improve functional strength and endurance. Pt was left supine in bed with HOB elevated to comfort with call light left by patient. Pt's/ family goals   1. To go home this am.     Patient and or family understand(s) diagnosis, prognosis, and plan of care. PHYSICAL THERAPY PLAN OF CARE:    PT POC is established based on physician order and patient diagnosis     Referring provider/PT Order:  PT eval and treat  Diagnosis:  Patellar tendon strain, left, initial encounter [H53.397O]  Specific instructions for next treatment:  practice functional mobility with pt.     Current Treatment Recommendations:     [x] Strengthening to improve independence with functional mobility   [] ROM to improve ROM and decrease spasm and pain which will help promote independence with functional mobility   [x] Balance Training to improve static/dynamic balance and to reduce fall risk  [x] Endurance Training to improve activity tolerance during functional mobility   [x] Transfer Training to improve safety and independence with all functional transfers   [x] Gait Training to improve gait mechanics, endurance and assess need for appropriate assistive device  [x] Stair Training in preparation for safe discharge home and/or into the community   [] Positioning to prevent skin breakdown and contractures  [] Safety and Education Training   [x] Patient/Caregiver Education   [] HEP  [] Other     PT long term treatment goals are located in above grid    Frequency of treatments: 2-5x/week x 1-2 weeks. Time in  10:15  Time out  10:45    Total Treatment Time 12 minutes     Evaluation Time includes thorough review of current medical information, gathering information on past medical history/social history and prior level of function, completion of standardized testing/informal observation of tasks, assessment of data and education on plan of care and goals. CPT codes:  [x] Low Complexity PT evaluation 80227  [] Moderate Complexity PT evaluation 87891  [] High Complexity PT evaluation 11040  [] PT Re-evaluation 04346  [] Gait training 52244 ** minutes  [] Manual therapy 00142 ** minutes  [x] Therapeutic activities 73060 12 minutes  [] Therapeutic exercises 62827 ** minutes  [] Neuromuscular reeducation 73828 ** minutes     Jose Luis Chu, P.T.   License Number: [id number]

## 2022-12-02 NOTE — DISCHARGE INSTRUCTIONS
Discharge Instructions Extensor Mechanism Reconstruction -Dr. Saud Vergara    Aspirin daily for DVT prophylaxis x30 days  Pain medication as needed  Ice packs to knee for 30 minutes every 3-4 hours  Keep operative leg elevated on pillows or blankets  Weightbearing as tolerated with walker and cast  Do not get cast wet.  Sponge baths  [facility] for home health care, PT and nursing  Return to office in 2 weeks: [phone] ext [phone]

## 2022-12-02 NOTE — ANESTHESIA POSTPROCEDURE EVALUATION
Department of Anesthesiology  Postprocedure Note    Patient: Cuate Hrud Masters  MRN: [id number]  YOB: 1950  Date of evaluation: 12/2/2022      Procedure Summary     Date: 12/01/22 Room / Location: [facility] OR 04 / [facility]    Anesthesia Start: 1558 Anesthesia Stop: 9546    Procedure: LEFT KNEE AUTOGRAFT RECONSTRUCTION EXTENSOR MECHANISM INDICATED PROCEDURES (Left: Knee) Diagnosis:       Patellar tendon strain, left, initial encounter      (Patellar tendon strain, left, initial encounter [P62.038P])    Surgeons: Alfa Sharma MD Responsible Provider: Libia Harmon MD    Anesthesia Type: Regional, MAC, Spinal ASA Status: 3          Anesthesia Type: Regional, MAC, Spinal    Eder Phase I: Eder Score: 10    Eder Phase II:        Anesthesia Post Evaluation    Patient location during evaluation: PACU  Patient participation: complete - patient participated  Level of consciousness: awake  Airway patency: patent  Nausea & Vomiting: no nausea and no vomiting  Complications: no  Cardiovascular status: hemodynamically stable  Respiratory status: acceptable  Hydration status: stable

## 2022-12-02 NOTE — PROGRESS NOTES
Occupational Therapy    OCCUPATIONAL THERAPY INITIAL EVALUATION     [facility]ammie [facility], [place]         BOYZ:                                                  Patient Name: Luis Workman    MRN: [id number]    : 1950    Room: [address] [place] [address]      Evaluating OT: Neymar Agarwal OTR/L   [id number]      Referring Rodrigo Xavier MD    Specific Provider Orders/Date:OT eval and treat 2022      Diagnosis:  Patellar tendon strain, left, initial encounter [L13.885D]    Surgery:  L knee autograft reconstruction of extensor mechanism with cast 2022    Pertinent Medical History: L TKA 11/3/2022  walking with staff and knee flexed and foot under pt and she ruptured L patellar tendon,  R knee has chronic limited knee flex 15-40    Precautions:  Fall Risk, WBAT L LE      Assessment of current deficits    [x] Functional mobility  [x]ADLs  [x] Strength               []Cognition    [x] Functional transfers   [x] IADLs         [x] Safety Awareness   [x]Endurance    [] Fine Coordination              [x] Balance      [] Vision/perception   []Sensation     []Gross Motor Coordination  [] ROM  [] Delirium                   [] Motor Control     OT PLAN OF CARE   OT POC based on physician orders, patient diagnosis and results of clinical assessment    Frequency/Duration  1-3 days/wk for 2 weeks PRN   Specific OT Treatment Interventions to include:   ADL retraining/adapted techniques and AE recommendations to increase functional independence within precautions                    Energy conservation techniques to improve tolerance for selfcare routine   Functional transfer/mobility training/DME recommendations for increased independence, safety and fall prevention         Patient/family education to increase safety and functional independence             Environmental modifications for safe mobility and completion of ADLs Therapeutic activity to improve functional performance during ADLs. Therapeutic exercise to improve tolerance and functional strength for ADLs    Balance retraining/tolerance tasks for facilitation of postural control with dynamic challenges during ADLs . Positioning to improve functional independence      Recommended Adaptive Equipment: 3:1 commode     Home Living: Pt lives with  in split level home with 4+1 ANA and B hand rails to enter house and then 7 ANA with B hand rails to main level . Pt's bedroom and bathroom are on the main level.    Bathroom setup: walk in shower with grab bar outside the shower, elevated commode  patient plans to sponge bath - d/cast   Equipment owned: wheeled walker, cane, reacher, soc Herrera Bold      Prior Level of Function:  assisting with lower body ADLs , assist from  with IADLs; functional mobility: walker       Pain Level: no pain this session ;   Cognition: A&O: 4/4;    Memory:  good    Sequencing:  good    Problem solving:  good    Judgement/safety:  good      Functional Assessment:  AM-PAC Daily Activity Raw Score: 17/24   Initial Eval Status  Date: 12/2/22 Treatment Status  Date: STGs = LTGs  Time frame: 10-14 days   Feeding Independent      Grooming Set-up   Independent    UB Dressing Set-up   Independent    LB Dressing Mod A  Assist with threading pants over L LE/cast  Patient able to stand to pull up over hips   Min A    Bathing Min A   Supervision    Toileting SBA   Mod I    Bed Mobility  SBA  Supine to sit   Mod I    Functional Transfers CGA  Increase time   Sit- stand from bed,   SBA  Sit- stand from elevated commode   Mod i   Functional Mobility SBA , w/walker   Ambulated to/from bathroom   Mod I  with good tolerance    Balance Sitting:     Static:  Independent     Dynamic:SBA   Standing: SBA   Independent    Activity Tolerance No SOB   Good  with ADL activity    Visual/  Perceptual Glasses: reading Hand Dominance right    AROM (PROM) Strength Additional Info:    RUE  WFL WFL good  and wfl FMC/dexterity noted during ADL tasks       LUE WFL WFL good  and wfl FMC/dexterity noted during ADL tasks       Hearing: WFL   Sensation:  No c/o numbness or tingling  Tone: WFL   Edema: none observed     Comments: Upon arrival patient lying in bed . At end of session, patient sitting in chair, leg elevated  ,  with call light and phone within reach, all lines and tubes intact. Overall patient demonstrated  decreased independence and safety during completion of ADL/functional transfer/mobility tasks. Pt would benefit from continued skilled OT to increase safety and independence with completion of ADL/IADL tasks for functional independence and quality of life. Rehab Potential: good for established goals     Patient / Family Goal: return home      Patient and/or family were instructed on functional diagnosis, prognosis/goals and OT plan of care. Demonstrated good  understanding. Eval Complexity: Low    Time In: 0920  Time Out: 0940      Min Units   OT Eval Low 97165 x  1   OT Eval Medium 93585      OT Eval High 97197      OT Re-Eval [id number]       Therapeutic Ex [id number]      Therapeutic Activities 97889       ADL/Self Care 68209       Orthotic Management 50387       Manual 85117     Neuro Re-Ed 90733       Non-Billable Time         Evaluation Time additionally includes thorough review of current medical information, gathering information on past medical history/social history and prior level of function, interpretation of standardized testing/informal observation of tasks, assessment of data and development of plan of care and goals.             Wylie Dandy  OTR/L  OT [id number]

## 2022-12-02 NOTE — PROGRESS NOTES
Extensor Mechanism Reconstruction    Post op Day 1      S: Patient is 1 day status post left knee autograft reconstruction extensor mechanism. She is doing well today. Her pain is well controlled on the medication she is taking. She does complain of an occasional sharp stabbing knee pain. She denies nausea and vomiting, has been able to tolerate her meals. Did not get a chance to work with physical therapy yesterday as she got up to the floor later in the evening. Plans on working with them today. Has been tolerating her cast well. O:     Vitals:    12/02/22 0725   BP: 139/66   Pulse: 92   Resp: 16   Temp: 98.6 °F (37 °C)   SpO2: 92%        Alert. Pleasant and cooperative throughout the exam.     Cast and ace wrap intact at the LLE. Left ankle dorsiflexion and plantarflexion intact. Patient can wiggle her toes. Sensation intact distally. Bilateral lower extremities neurovascularly intact. H/H:   Recent Labs     12/02/22  0530   HGB 9.7*   HCT 31.9*           A/P:   1 day status post left knee autograft reconstruction extensor mechanism   Stable   Work with PT/OT, WBAT LLE   Continue cast. Review cast care. Continue oxycodone for pain control   Aspirin 81 mg daily for DVT prophylaxis   Progress diet as tolerated   D/c planning-we will see how she does with PT. Probable discharge home tomorrow.

## 2022-12-03 NOTE — OP NOTE
[id number] [address]                                OPERATIVE REPORT    PATIENT NAME: Isacc Cortez                    :        1950  MED REC NO:   [id number]                            ROOM:       2920  ACCOUNT NO:   [de-identified]                           ADMIT DATE: 2022  PROVIDER:     Emilee Chavira MD    DATE OF PROCEDURE:  2022    PREOPERATIVE DIAGNOSIS:  Extensor mechanism rupture status post left  total knee arthroplasty. POSTOPERATIVE DIAGNOSIS:  Extensor mechanism rupture status post left  total knee arthroplasty. PROCEDURES PERFORMED:  1. Repair, left knee extensor mechanism - infrapatellar tendon and  medial and lateral retinaculum. 2.  Autogenous semitendinosus augmentation graft, left knee extensor mechanism. 3.  Application of cylinder cast, left lower extremity. SURGEON:  Emilee Chavira MD.    ASSISTANT SURGEON:  Jessica Bunch MD.    ASSISTANT:  Niles Valverde PA-C. No qualified surgical resident  available. ANESTHESIA:  Spinal.    ESTIMATED BLOOD LOSS:  Approximately 100. COMPLICATIONS:  None. CONDITION:  Stable to Recovery. INDICATIONS:  The patient is a 66-year-old woman about four-week status  post left total knee arthroplasty for severe osteoarthritis. She had a  fall in the hospital on postoperative day 1 with hyperflexion of the  left knee. Radiographs consistent with extensor mechanism disruption. She presents today for repair of the extensor mechanism following  explanation of the risks, benefits, alternatives, and limitations. Informed consent obtained. DESCRIPTION OF PROCEDURE:  The patient was met in the preop holding  area. Left knee was marked as the correct operative site. Peripheral  nerve block was given by Anesthesia Department, taken to the operating  room, where spinal anesthesia was administered.   Intravenous antibiotics  and tranexamic acid were given per protocol. Tourniquet was placed in  the proximal left thigh. Left lower extremity prepped and draped in the  usual sterile fashion. Following a surgical time-out, the limb was  elevated and exsanguinated with an Esmarch. Tourniquet inflated to 250  mmHg. I accessed the left knee through her previous midline incision  extending it proximally and distally. Full-thickness medial and lateral  flaps were raised. There was a complete transverse disruption of the  extensor mechanism through the midsubstance of the patellar tendon  extending down to the mid coronal line both medially and laterally  including the complete disruption of both retinaculum. The femoral and  tibial components were well aligned and well fixed including the  polyethylene bearing and polyethylene patellar button. The knee was  lavaged of hematoma and loose debris. Attention was then turned to the  reconstruction. We freed up the extensor mechanism proximally along the  inferior cortex of the femur to release the quadriceps mechanism. Krackow sutures were placed using #5 Ethibond into the proximal stump of  the patellar tendon and the patellar tendon was pulled distally to  reapproximate its anatomic position at the joint line. Additional  Krackow sutures were placed in the infrapatellar tendon and with the  knee in full extension, the tendon could be reapproximated. We elected  to augment the fixation with autogenous tendon graft. Lawrence Ray MD, was able to harvest the semitendinosus tendon isolating at the  distal attachment of the pes anserinus. We used an open tendon stripper  to harvest the semitendinosus in its entirety. Muscle tissue was  trimmed from the graft. A whipstitch was placed into the graft to help  with mobilization through the bone tunnels. Additional #5 Ethibond  sutures were then placed in both the medial and lateral retinaculum.    With the knee in full extension, the retinacular sutures were then  secured followed by the Krackow sutures to the patellar tendon. We used  the guide pin and a 5.0-mm cannulated drill to make two transverse bone  tunnels, one through the mid portion of the patella and the second  through the tibial tuberosity. We brought the semitendinosus up along  the medial aspect of the patellar tendon going transversely in a lateral  direction through the patellar tunnel ____and_ then inferiorly down the  lateral edge of the patellar tendon and finally through the tibial  tuberosity from a lateral to medial direction. The tendon was then  tensioned appropriately and a single metal staple was used to secure the  graft distally just medial to the tibial tuberosity. Additional #2  Ethibond sutures were used to incorporate the semitendinosus graft into  the extensor mechanism using interrupted figure-of-eight sutures. Following repair, the extensor mechanism was completely closed. I  tested the repair to approximately 20 to 30 degrees and it was secured. The wound was then copiously lavaged with dilute 1 liter Betadine  followed by saline. A 1 gm of vancomycin powder was placed along the  repair followed by closure of the adipose layer with interrupted 0  Vicryl. The skin was then closed in layers with surgical staples for  the skin as well as a sterile Aquacel dressing. A cocktail injection  was then given into the knee using an 18-gauge needle. We then applied  a well-padded cylinder cast from the proximal thigh down to the proximal  ankle with appropriate molds for comfort and stability. The cast was  then bivalved and wrapped with an Ace bandage to allow for postoperative  swelling. The patient tolerated the procedure well without  intraoperative complication. At the conclusion of the case, all sponge  and needle counts were correct.   She was transferred from the operating  table onto her hospital bed, transported to Recovery in stable  condition. Of note, physician assistant was present throughout the entirety of the  case. Her presence was necessary to aid with patient positioning,  draping, limb positioning, wound closure, application of surgical  dressing and cast, and patient transport from the operating room table. No qualified surgical resident available.         Sonal Xie MD    D: 12/02/2022 13:17:47       T: 12/02/2022 13:24:22     [email or web address]  Job#: [id number]     Doc#: [id number]    CC:

## 2022-12-05 NOTE — DISCHARGE SUMMARY
Physician Discharge Summary     Patient ID:  Hernando Zarate  [id number]  42 y.o.  1950    Admit date: 12/1/2022    Discharge date and time: 12/2/2022  4:18 PM     Admitting Physician: Jose F Menard MD     Surgeon: Stephan Land. Sabina Platt M.D. Admission Diagnoses: Left knee extensor mechanism disruption with infrapatellar tendon tear status post total knee arthroplasty. Discharge Diagnoses: Repair left knee extensor mechanism-infrapatellar tendon, medial and lateral retinaculum with autogenous semitendonosis augmentation graft. Discharged Condition: Stable    Hospital Course:  Uncomplicated, nadege diet, pain controlled with oral meds. Exam - cylinder cast intact, ankle ROM intact, sensation intact distally, nvi. Consults: PT, OT, internal medicine    Antibiotic Prophylaxis:  24 hrs perioperative Ancef      DVT Prophylaxis:  ASA 81 mg daily    Weight Bearing Status:  WBAT in cast with assistive device    Disposition: home with [facility]    Patient Instructions:      Medication List        START taking these medications      oxyCODONE 5 MG immediate release tablet  Commonly known as: ROXICODONE  Take 1 tablet by mouth every 4 hours as needed for Pain for up to 7 days.      sennosides-docusate sodium 8.6-50 MG tablet  Commonly known as: SENOKOT-S  Take 1 tablet by mouth 2 times daily For prevention of constipation            CHANGE how you take these medications      aspirin 81 MG EC tablet  Take 1 tablet by mouth daily For DVT prophylaxis  What changed:   when to take this  additional instructions            CONTINUE taking these medications      levothyroxine 75 MCG tablet  Commonly known as: SYNTHROID     meloxicam 15 MG tablet  Commonly known as: MOBIC     metoprolol succinate 25 MG extended release tablet  Commonly known as: TOPROL XL  Take 1 tablet by mouth daily     pravastatin 80 MG tablet  Commonly known as: PRAVACHOL     vitamin C 500 MG tablet  Commonly known as: ASCORBIC ACID     Vitamin D3 125 MCG (5000 UT) Tabs     zinc gluconate 50 MG tablet               Where to Get Your Medications        You can get these medications from any pharmacy    Bring a paper prescription for each of these medications  aspirin 81 MG EC tablet  oxyCODONE 5 MG immediate release tablet  sennosides-docusate sodium 8.6-50 MG tablet        Activity: activity as tolerated in cast, no driving while on analgesics, and no heavy lifting for 4 weeks  Diet: regular diet  Wound Care: Cast intact, will be removed at first post-op appointment.  Will address incision at that time      Follow-up in the office at 2 weeks post-op [phone], Ext [phone]    Signed:  Cinthia Radford PA-C  12/5/2022  8:30 AM

## 2022-12-06 NOTE — OP NOTE
[id number] [address]                                OPERATIVE REPORT    PATIENT NAME: Cecille Mccarthy                    :        1950  MED REC NO:   [id number]                            ROOM:       7233  ACCOUNT NO:   [de-identified]                           ADMIT DATE: 2022  PROVIDER:     Ema Rodríguez MD    DATE OF PROCEDURE:  2022    PREOPERATIVE DIAGNOSIS:  Rupture of extensor mechanism, left knee  following previous total knee replacement. POSTOPERATIVE DIAGNOSIS:  Rupture of extensor mechanism, left knee  following previous total knee replacement. OPERATIVE PROCEDURE:  [place] of hamstring tendon from distant site for  reconstruction of extensor mechanism, left TKA, CPT 37337. SURGEON:  Karen Bartlett MD.    ANESTHESIA:  General.    OPERATIVE INDICATIONS:  This is a 75-year-old female who is a patient of  Dr. Elvis Damian. She is approximately three to four weeks out from  total knee arthroplasty and reportedly fell while in the hospital  sustaining a complete rupture of her extensor mechanism. Given her poor  tissue quality and poor knee flexion preop, Dr. Seth Ward has asked if I  would harvest a hamstring autograft for augmentation of extensor  mechanism repair and reconstruction on his patient. DESCRIPTION OF PROCEDURE:  The patient was placed under general  anesthesia and Dr. Seth Ward exposed the knee, which revealed a proximal  complete rupture of the patellar tendon with a small amount of tendon  remaining on the inferior pole of the patella and complete retinacular  disruption medial and lateral.  As mentioned previously, her tissue  quality was not great and her preoperative range of motion in terms of  flexion was very poor.   He assessed directly reapproximating the tissue,  which could be done with the knee in extension, but given the tissue  quality and the tightness with flexion, this was certainly at high risk  for failure and this warranted augmentation of some sort. I exposed the  pes anserine tendon attachment to the tibia at the distal aspect of the  knee incision and palpated the semitendinosus and gracilis tendons and  then split the sartorius fascia in between those two tendons in order to  localize the semitendinosus. The semitendinosus was left attached  distally at the tibia and an open tendon stripper was used to harvest  the tendon by sliding this around and up the tendon proximally. This  gave us a 22 cm long tendon that was of good diameter. A #2 Ethibond  suture was woven in a Krackow fashion up the tendon end and back down  proximally and this was used to manipulate and pass the tendon for the  reconstruction. A guide pin was then drilled across the tibia as well  as across the patella. I did assist Dr. Angela Herzog in the reconstruction,  but served as primary surgeon for the tendon harvest.  Details of the  reconstruction are noted in his operative report.         Génesis Pringle MD    D: 12/05/2022 19:15:01       T: 12/05/2022 19:17:31     [email or web address]  Job#: [id number]     Doc#: [id number]    CC:

## (undated) DEVICE — SOLUTION IRRIG 2000ML 0.9% SOD CHL USP UROMATIC PLAS CONT

## (undated) DEVICE — SCREW BNE AD AND PED L33MM DIA32MM HD DIA35MM CANC TI
Type: IMPLANTABLE DEVICE | Status: NON-FUNCTIONAL
Removed: 2022-11-03

## (undated) DEVICE — NEEDLE FLTR 18GA L1.5IN MEM THK5UM BLNT DISP

## (undated) DEVICE — STRIP,CLOSURE,WOUND,MEDI-STRIP,1/2X4: Brand: MEDLINE

## (undated) DEVICE — GLOVE SURG SZ 8 CRM LTX FREE POLYISOPRENE POLYMER BEAD ANTI

## (undated) DEVICE — DRAPE,TOP,102X53,STERILE: Brand: MEDLINE

## (undated) DEVICE — BNDG,ELSTC,MATRIX,STRL,4"X5YD,LF,HOOK&LP: Brand: MEDLINE

## (undated) DEVICE — GLOVE SURG SZ 65 L12IN FNGR THK79MIL GRN LTX FREE

## (undated) DEVICE — DOUBLE BASIN SET: Brand: MEDLINE INDUSTRIES, INC.

## (undated) DEVICE — ELECTRODE PT RET AD L9FT HI MOIST COND ADH HYDRGEL CORDED

## (undated) DEVICE — TOTAL KNEE PK

## (undated) DEVICE — SPONGE,DRAIN,NONWVN,4"X4",6PLY,STRL,LF: Brand: MEDLINE

## (undated) DEVICE — 3M™ STERI-DRAPE™ U-DRAPE 1015: Brand: STERI-DRAPE™

## (undated) DEVICE — CHLORAPREP 26ML ORANGE

## (undated) DEVICE — GLOVE SURG SZ 65 THK91MIL LTX FREE SYN POLYISOPRENE

## (undated) DEVICE — INTENDED FOR TISSUE SEPARATION, AND OTHER PROCEDURES THAT REQUIRE A SHARP SURGICAL BLADE TO PUNCTURE OR CUT.: Brand: BARD-PARKER ® STAINLESS STEEL BLADES

## (undated) DEVICE — SPONGE LAP W18XL18IN WHT COT 4 PLY FLD STRUNG RADPQ DISP ST

## (undated) DEVICE — KIT SURG W7XL11IN 2 PKT UNTREATED NA

## (undated) DEVICE — BANDAGE COMPR W6INXL12FT SMOOTH FOR LIMB EXSANG ESMARCH

## (undated) DEVICE — TUBE IRRIG HNDPC HI FLO TP INTRPULS W/SUCTION TUBE

## (undated) DEVICE — CONVERTORS STOCKINETTE: Brand: CONVERTORS

## (undated) DEVICE — SOLUTION IV IRRIG POUR BRL 0.9% SODIUM CHL 2F7124

## (undated) DEVICE — DRAPE,REIN 53X77,STERILE: Brand: MEDLINE

## (undated) DEVICE — Device

## (undated) DEVICE — TUBING, SUCTION, 9/32" X 10', STRAIGHT: Brand: MEDLINE

## (undated) DEVICE — PADDING,UNDERCAST,COTTON, 4"X4YD STERILE: Brand: MEDLINE

## (undated) DEVICE — PADDING CAST W6INXL4YD COT LO LINTING WYTEX

## (undated) DEVICE — SOLUTION IV IRRIG WATER 1000ML POUR BRL 2F7114

## (undated) DEVICE — TAPE ADH W3INXL10YD WHT COT WVN BK POWERFUL RUB BASE HIGHLY

## (undated) DEVICE — BASIC SINGLE BASIN 1-LF: Brand: MEDLINE INDUSTRIES, INC.

## (undated) DEVICE — BNDG,ELSTC,MATRIX,STRL,6"X5YD,LF,HOOK&LP: Brand: MEDLINE

## (undated) DEVICE — STERILE PVP: Brand: MEDLINE INDUSTRIES, INC.

## (undated) DEVICE — BLADE SAW W11XL77.5MM THK1.23MM CUT THK1.17MM S STL RECIP

## (undated) DEVICE — MARKER,SKIN,WI/RULER AND LABELS: Brand: MEDLINE

## (undated) DEVICE — TOWEL,OR,DSP,ST,BLUE,STD,6/PK,12PK/CS: Brand: MEDLINE

## (undated) DEVICE — BIT DRL L5IN DIA2.4MM STD ST S STL TWST BUSA

## (undated) DEVICE — 4-PORT MANIFOLD: Brand: NEPTUNE 2

## (undated) DEVICE — KIT INSTR TRANSTIBIAL ACL W/ SAW BLDE DISP

## (undated) DEVICE — PACK PROCEDURE SURG GEN CUST

## (undated) DEVICE — 2108 SERIES SAGITTAL BLADE FAN, OFFSET  (34.5 X 0.8 X 64.0MM)

## (undated) DEVICE — SYRINGE MED 50ML LUERLOCK TIP

## (undated) DEVICE — GLOVE ORTHO 8   MSG9480

## (undated) DEVICE — GLOVE SURG SZ 8 L12IN FNGR THK94MIL TRNSLUC YEL LTX HYDRGEL

## (undated) DEVICE — SOLUTION IV 1000ML 0.9% SOD CHL PH 5 INJ USP VIAFLX PLAS

## (undated) DEVICE — HEWSON SUTURE RETRIEVER: Brand: HEWSON SUTURE RETRIEVER

## (undated) DEVICE — RECIPROCATING BLADE, DOUBLE SIDED, OFFSET  (70.0 X 0.64 X 12.6MM)

## (undated) DEVICE — INTENT TO BE USED WITH SUTURE MATERIAL FOR TISSUE CLOSURE: Brand: RICHARD-ALLAN® NEEDLE 1/2 CIRCLE TAPER

## (undated) DEVICE — Device: Brand: STABLECUT®

## (undated) DEVICE — NEEDLE HYPO 25GA L1.5IN BLU POLYPR HUB S STL REG BVL STR

## (undated) DEVICE — GOWN,SIRUS,FABRNF,XL,20/CS: Brand: MEDLINE

## (undated) DEVICE — COVER HNDL LT DISP

## (undated) DEVICE — IMPLANTABLE DEVICE
Type: IMPLANTABLE DEVICE | Site: KNEE | Status: NON-FUNCTIONAL
Removed: 2022-11-03

## (undated) DEVICE — 3M™ IOBAN™ 2 ANTIMICROBIAL INCISE DRAPE 6650EZ: Brand: IOBAN™ 2

## (undated) DEVICE — SYRINGE 20ML LL S/C 50

## (undated) DEVICE — HANDPIECE SET WITH COAXIAL HIGH FLOW TIP AND SUCTION TUBE: Brand: INTERPULSE

## (undated) DEVICE — NEEDLE HYPO 22GA L1.5IN BLK POLYPR HUB S STL REG BVL STR

## (undated) DEVICE — KIT EVAC 400CC DIA1/8IN H PAT 12.5IN 3 SPR RND SHP PVC DRN

## (undated) DEVICE — Y-TYPE TUR/BLADDER IRRIGATION SET, REGULATING CLAMP

## (undated) DEVICE — DRESSING HYDROFIBER AQUACEL AG ADVANTAGE 3.5X14 IN

## (undated) DEVICE — ZIMMER® STERILE DISPOSABLE TOURNIQUET CUFF WITH PLC, DUAL PORT, SINGLE BLADDER, 34 IN. (86 CM)

## (undated) DEVICE — SYSTEM VAC MIX SGL DBL CLEARMIX 10 PER CA

## (undated) DEVICE — DRESSING HYDROFIBER AQUACEL AG ADVANTAGE 3.5X12 IN

## (undated) DEVICE — SUTURE STRATAFIX SYMMETRIC PDS + SZ 1 L18IN ABSRB VLT OS-6 SXPP1A201

## (undated) DEVICE — COVER,LIGHT HANDLE,FLX,2/PK: Brand: MEDLINE INDUSTRIES, INC.